# Patient Record
Sex: FEMALE | Race: BLACK OR AFRICAN AMERICAN | Employment: FULL TIME | URBAN - METROPOLITAN AREA
[De-identification: names, ages, dates, MRNs, and addresses within clinical notes are randomized per-mention and may not be internally consistent; named-entity substitution may affect disease eponyms.]

---

## 2021-08-20 ENCOUNTER — HOSPITAL ENCOUNTER (EMERGENCY)
Age: 46
Discharge: HOME | End: 2021-08-20
Attending: EMERGENCY MEDICINE
Payer: COMMERCIAL

## 2021-08-20 VITALS
BODY MASS INDEX: 38.32 KG/M2 | HEART RATE: 90 BPM | DIASTOLIC BLOOD PRESSURE: 90 MMHG | OXYGEN SATURATION: 98 % | SYSTOLIC BLOOD PRESSURE: 151 MMHG | RESPIRATION RATE: 16 BRPM | HEIGHT: 65 IN | TEMPERATURE: 98.6 F | WEIGHT: 230 LBS

## 2021-08-20 DIAGNOSIS — S39.012A STRAIN OF LUMBAR REGION, INITIAL ENCOUNTER: Primary | ICD-10-CM
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
## 2021-08-20 PROCEDURE — 99284 EMERGENCY DEPT VISIT MOD MDM: CPT

## 2021-08-20 RX ORDER — IBUPROFEN 600 MG/1
600 TABLET ORAL EVERY 8 HOURS PRN
Qty: 30 TABLET | Refills: 0 | Status: SHIPPED | OUTPATIENT
Start: 2021-08-20 | End: 2022-05-03 | Stop reason: ALTCHOICE
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
## 2022-05-03 ENCOUNTER — APPOINTMENT (OUTPATIENT)
Dept: GENERAL RADIOLOGY | Age: 47
End: 2022-05-03
Payer: COMMERCIAL

## 2022-05-03 ENCOUNTER — HOSPITAL ENCOUNTER (EMERGENCY)
Age: 47
Discharge: HOME | End: 2022-05-03
Attending: STUDENT IN AN ORGANIZED HEALTH CARE EDUCATION/TRAINING PROGRAM
Payer: COMMERCIAL

## 2022-05-03 VITALS
HEART RATE: 83 BPM | WEIGHT: 228.84 LBS | RESPIRATION RATE: 20 BRPM | HEIGHT: 66 IN | BODY MASS INDEX: 36.78 KG/M2 | SYSTOLIC BLOOD PRESSURE: 170 MMHG | DIASTOLIC BLOOD PRESSURE: 87 MMHG | OXYGEN SATURATION: 97 % | TEMPERATURE: 98.4 F

## 2022-05-03 DIAGNOSIS — M25.511 ACUTE PAIN OF RIGHT SHOULDER: Primary | ICD-10-CM

## 2022-05-03 PROCEDURE — 6370000000 HC RX 637 (ALT 250 FOR IP): Performed by: STUDENT IN AN ORGANIZED HEALTH CARE EDUCATION/TRAINING PROGRAM

## 2022-05-03 PROCEDURE — 99283 EMERGENCY DEPT VISIT LOW MDM: CPT

## 2022-05-03 PROCEDURE — 73030 X-RAY EXAM OF SHOULDER: CPT

## 2022-05-03 RX ORDER — METHOCARBAMOL 500 MG/1
500 TABLET, FILM COATED ORAL 3 TIMES DAILY PRN
Qty: 40 TABLET | Refills: 0 | Status: SHIPPED | OUTPATIENT
Start: 2022-05-03 | End: 2022-05-13

## 2022-05-03 RX ORDER — METHOCARBAMOL 750 MG/1
750 TABLET, FILM COATED ORAL ONCE
Status: DISCONTINUED | OUTPATIENT
Start: 2022-05-03 | End: 2022-05-04 | Stop reason: HOSPADM

## 2022-05-03 RX ORDER — ACETAMINOPHEN 325 MG/1
650 TABLET ORAL ONCE
Status: COMPLETED | OUTPATIENT
Start: 2022-05-03 | End: 2022-05-03

## 2022-05-03 RX ADMIN — ACETAMINOPHEN 650 MG: 325 TABLET ORAL at 22:10

## 2022-05-03 ASSESSMENT — PAIN SCALES - GENERAL: PAINLEVEL_OUTOF10: 7

## 2022-05-04 NOTE — ED NOTES
Signed into triage for pain to right shoulder, arm, and neck. Trying to finish triage questions. Finished most of medical hx. Then pt wants to wait a little bit to answer questions about chief complaint. Brought pt gown and asked her to remove street clothes and put it on.      Cristi Bo RN  05/03/22 2170

## 2022-05-04 NOTE — ED NOTES
Discharge instructions with pt. Explained rx. Encouraged to follow up with orthopedic as referred. Also encouraged follow up with her [facility] PCP.   Pain at [facility], RN  05/03/22 7959

## 2022-05-04 NOTE — ED NOTES
Pain right upper arm intermittently with movement for past 2 weeks. Pain shoots up to neck and to right upper back/  Pain at 5-6     Took tylenol at noon.        Doroteo Loera RN  05/03/22 4931

## 2022-05-04 NOTE — ED PROVIDER NOTES
Primary Care Physician: No primary care provider on file. Attending Physician: No att. providers found     History   Chief Complaint   Patient presents with    Arm Pain     Pain right upper arm intermittently with movement for past 2 weeks. Pain shoots up to neck and to right upper back/  Pain at 5-6     Took tylenol at noon.  Shoulder Pain    Neck Pain        HPI   Ignacio Feliz  is a 42 y.o. female history of diabetes, hypertension who is presenting complaining of right upper extremity pain which has been going on now for the past 2 weeks now with shooting pain to the neck as well as the upper back. Pain is 516 in intensity. She did take some Tylenol with no significant improvement. She is also complaining of some neck pain but no neck rigidity. She denies any fevers chills nausea vomiting chest pain or shortness of breath. Past Medical History:   Diagnosis Date    Diabetes mellitus ([address]     GERD (gastroesophageal reflux disease)     Hypertension         History reviewed. No pertinent surgical history.      Family History   Problem Relation Age of Onset    Diabetes Mother     Cancer Mother     Hypertension Father     Diabetes Sister     Diabetes Brother         Social History     Socioeconomic History    Marital status: Legally      Spouse name: None    Number of children: None    Years of education: None    Highest education level: None   Occupational History    None   Tobacco Use    Smoking status: Never Smoker    Smokeless tobacco: None   Substance and Sexual Activity    Alcohol use: No    Drug use: No    Sexual activity: None   Other Topics Concern    None   Social History Narrative    None     Social Determinants of Health     Financial Resource Strain:     Difficulty of Paying Living Expenses: Not on file   Food Insecurity:     Worried About Running Out of Food in the Last Year: Not on file    [place] of Food in the Last Year: Not on ROB Gonzalez Needs:     Lack of Transportation (Medical): Not on file    Lack of Transportation (Non-Medical): Not on file   Physical Activity:     Days of Exercise per Week: Not on file    Minutes of Exercise per Session: Not on file   Stress:     Feeling of Stress : Not on file   Social Connections:     Frequency of Communication with Friends and Family: Not on file    Frequency of Social Gatherings with Friends and Family: Not on file    Attends Catholic Services: Not on file    Active Member of [address] [place] [address] or Organizations: Not on file    Attends Club or Organization Meetings: Not on file    Marital Status: Not on file   Intimate Partner Violence:     Fear of Current or Ex-Partner: Not on file    Emotionally Abused: Not on file    Physically Abused: Not on file    Sexually Abused: Not on file   Housing Stability:     Unable to Pay for Housing in the Last Year: Not on file    Number of [place] in the Last Year: Not on file    Unstable Housing in the Last Year: Not on file        Review of Systems   10 total systems reviewed and found to be negative unless otherwise noted in HPI     Physical Exam   BP (!) 170/87   Pulse 83   Temp 98.4 °F (36.9 °C) (Oral)   Resp 20   Ht 5' 6\" (1.676 m)   Wt 228 lb 13.4 oz (103.8 kg)   LMP 04/12/2022   SpO2 97%   BMI 36.94 kg/m²      CONSTITUTIONAL: Well appearing, in no acute distress   HEAD: atraumatic, normocephalic   EYES: PERRL, No injection, discharge or scleral icterus. ENT: Moist mucous membranes. NECK: Normal ROM, NO LAD   CARDIOVASCULAR: Regular rate and rhythm. No murmurs or gallop. PULMONARY/CHEST: Airway patent. No retractions. Breath sounds clear with good air entry bilaterally. ABDOMEN: Soft, Non-distended and non-tender, without guarding or rebound. SKIN: Acyanotic, warm, dry   MUSCULOSKELETAL: No swelling, tenderness or deformity   NEUROLOGICAL: Awake and oriented x 3. Pulses intact. Grossly nonfocal   Nursing note and vitals reviewed.      ED Course & Medical Decision Making   Medications   acetaminophen (TYLENOL) tablet 650 mg (650 mg Oral Given 5/3/22 2210)      Labs Reviewed - No data to display   XR SHOULDER RIGHT (MIN 2 VIEWS)   Final Result   1. There is slight elevation of the distal clavicle relative to the inferior   margin of the acromion by approximately 2-3 mm.   2. The shoulder is otherwise unremarkable. PROCEDURES:   Procedures    ASSESSMENT AND PLAN:  [id number], Deborah Chaudhari is a 42 y.o. female is presenting right upper extremity pain on exam patient appears nontoxic in no acute distress tender to palpation around the bicep area. Otherwise no other abnormal findings on exam.  An x-ray was obtained with no significant findings on the x-ray. At this time the cause of her pain is unknown less likely patient needs an emergent intervention. She was discharged home with recommendation to follow-up with orthopedics for further evaluation and treatment. ClINICAL IMPRESSION:  1. Acute pain of right shoulder          PATIENT REFERRED TO:  [place] [address] [address] [address]  [phone]    Schedule an appointment as soon as possible for a visit in 2 days        DISCHARGE MEDICATIONS:  Discharge Medication List as of 5/3/2022 11:01 PM      START taking these medications    Details   methocarbamol (ROBAXIN) 500 MG tablet Take 1 tablet by mouth 3 times daily as needed (pain), Disp-40 tablet, R-0Print           DISCONTINUED MEDICATIONS:  Discharge Medication List as of 5/3/2022 11:01 PM        DISPOSITION Decision To Discharge 05/03/2022 10:49:24 PM  -We have instructed the patient, Deborah Chaudhari) to return to the ED or call her PCP if her pain/symptoms worsen. -Findings and recommendations explained to patient.  She expressed understanding and agreed with the plan.    ___________________________________________________________________________________  _________________________________________________________________________________________  This record is transcribed utilizing voice recognition technology. There are inherent limitations in this technology. In addition, there may be limitations in editing of this report. If there are any discrepancies, please contact me directly.         Maryjane Cavazos MD  05/04/22 0650

## 2022-05-04 NOTE — ED NOTES
Pt wants to drive self home. Will take robaxin at home. EMD aware. Pain at 6 right now.      Sherie Bal RN  05/03/22 3430

## 2022-05-05 ENCOUNTER — OFFICE VISIT (OUTPATIENT)
Dept: ORTHOPEDIC SURGERY | Age: 47
End: 2022-05-05
Payer: COMMERCIAL

## 2022-05-05 VITALS — BODY MASS INDEX: 36.64 KG/M2 | HEIGHT: 66 IN | WEIGHT: 228 LBS

## 2022-05-05 DIAGNOSIS — M54.12 CERVICAL RADICULITIS: ICD-10-CM

## 2022-05-05 DIAGNOSIS — M54.2 NECK PAIN: Primary | ICD-10-CM

## 2022-05-05 DIAGNOSIS — M50.30 DDD (DEGENERATIVE DISC DISEASE), CERVICAL: ICD-10-CM

## 2022-05-05 PROCEDURE — G8427 DOCREV CUR MEDS BY ELIG CLIN: HCPCS | Performed by: PHYSICIAN ASSISTANT

## 2022-05-05 PROCEDURE — 99204 OFFICE O/P NEW MOD 45 MIN: CPT | Performed by: PHYSICIAN ASSISTANT

## 2022-05-05 PROCEDURE — G8417 CALC BMI ABV UP PARAM F/U: HCPCS | Performed by: PHYSICIAN ASSISTANT

## 2022-05-05 PROCEDURE — 1036F TOBACCO NON-USER: CPT | Performed by: PHYSICIAN ASSISTANT

## 2022-05-05 RX ORDER — GABAPENTIN 100 MG/1
100 CAPSULE ORAL 3 TIMES DAILY
Qty: 90 CAPSULE | Refills: 0 | Status: SHIPPED | OUTPATIENT
Start: 2022-05-05 | End: 2022-10-03 | Stop reason: ALTCHOICE

## 2022-05-05 RX ORDER — METHYLPREDNISOLONE 4 MG/1
TABLET ORAL
Qty: 1 KIT | Refills: 0 | Status: SHIPPED | OUTPATIENT
Start: 2022-05-05 | End: 2022-05-23 | Stop reason: ALTCHOICE

## 2022-05-05 NOTE — PROGRESS NOTES
History of present illness:   Ms. Jose Alberto Mac is a pleasant 42 y.o. old female kindly referred by [facility] ER regarding Ms. Nasreen Rodrigues's neck and right arm pain. She states the pain began 3 weeks ago. Her pain has steadily worsened since then. She rates her neck and right arm pain 7/10 VAS. She describes the pain as aching, throbbing pain. The arm pain radiates to her right forearm. She reports intermittent numbness and tingling in the right upper extremity. She reports intermittent weakness of her right arm. She denies, lower extremity symptoms, gait abnormality and bowel or bladder dysfunction. The pain occasionally interferes with her sleep. She has tried medications prescribed in the ER including ibuprofen and Robaxin. Past medical history:   Her past medical history has been reviewed. Past Medical History:   Diagnosis Date    Diabetes mellitus ([address]     GERD (gastroesophageal reflux disease)     Hypertension         Her past surgical history has been reviewed. History reviewed. No pertinent surgical history. Her  medications and allergies were reviewed. Current Outpatient Medications   Medication Sig Dispense Refill    methylPREDNISolone (MEDROL, KIMBERLY,) 4 MG tablet Take by mouth. 6 po day one 5 po day 2 4 po day 3 3 po day 4 2 po day 5 1 po day 6. 1 kit 0    gabapentin (NEURONTIN) 100 MG capsule Take 1 capsule by mouth 3 times daily for 30 days. Intended supply: 30 days 90 capsule 0    methocarbamol (ROBAXIN) 500 MG tablet Take 1 tablet by mouth 3 times daily as needed (pain) 40 tablet 0     No current facility-administered medications for this visit. Allergies   Allergen Reactions    Diflucan [Fluconazole] Hives        Her social history has been reviewed. Social History     Occupational History    Not on file   Tobacco Use    Smoking status: Never Smoker    Smokeless tobacco: Never Used   Substance and Sexual Activity    Alcohol use: No    Drug use:  No  Sexual activity: Not on file         Her family history has been reviewed. Family History   Problem Relation Age of Onset    Diabetes Mother     Cancer Mother     Hypertension Father     Diabetes Sister     Diabetes Brother            Review of Systems:  I have reviewed the clinically relevant past medical history, medications, allergies, family history, social history, and 13 point Review of Systems from the patient's recent history form & documented any details relevant to today's presenting complaints in the history above. The patient's self-reported past medical history, medications, allergies, family history, social history, and Review of Systems form from today's date have been scanned into the chart under the \"Media\" tab. Physical examination:   Ms. Olena Phelps most recent vitals:  Vitals  Height: 5' 6\" (167.6 cm)  Weight: 228 lb (103.4 kg)  Body mass index is 36.8 kg/m². General Exam:  She is well-developed and well-nourished, is in obvious pain and alert and oriented to person, place, and time. She demonstrates appropriate mood and affect. She walks with a normal gait. HEENT:   Her cervical flexion, extension, and axial rotation are mildly reduced with pain. Her skin is warm and dry. She has moderate tenderness over her right lateral cervical spine and no obvious muscle spasm. The skin over her cervical spine is normal without surgical scar. Upper extremities:  She has 5/5 strength of her interosseous muscles, wrist dorsiflexors and volarflexors, biceps, triceps, deltoids, and internal and external rotators of her shoulders, bilaterally. Her biceps, triceps, bracheoradialis, quadriceps and achilles reflexes are 2+, bilaterally. Sensation is intact to light touchfrom C6 to C8. She has no clonus and negative Cornejo's bilaterally. Examination of the right shoulder: There is no deformity. There is no erythema. There is no  soft tissue swelling.    Deltoid region is not tender to palpation. Scapula/ trapezius is  tender to palpation. There is no weakness or pain with rotator cuff testing. Shoulder Active ROM -is near full without pain. Lower extremities:  Normal DTR knees, no clonus. Imaging:   X Rays were obtained in the office today:  AP and lateral cervical spine shows multilevel degenerative disc disease C4-5, C5-6 and C6-7. Diagnosis:      ICD-10-CM    1. Neck pain  M54.2 XR CERVICAL SPINE (2-3 VIEWS)   2. DDD (degenerative disc disease), cervical  M50.30 Ambulatory referral to Physical Therapy   3. Cervical radiculitis  M54.12 Ambulatory referral to Physical Therapy          Assessment:  Cervical radiculitis with degenerative disc disease cervical spine. I had an extensive discussion with Ms. Fredy Rois and/or family regarding the natural history, etiology, and long term consequences of her condition. I have presented reasonable alternatives to the patient's proposed care, treatment, and services. Risks and benefits of the treatment options also reviewed in detail. I have outlined a treatment plan with them. She has had full opportunity to ask her questions. I have answered them all to her satisfaction. I feel that Ms. Fredy Rios understands our discussion today. Plan:  Medications-  Medrol Dosepak, 4 mg tablets. Gabapentin 100 mg titrate up to 3 times daily. OTC NSAIDS discussed. 1. The most common side effects from NSAIDs are stomachaches, heartburn, and nausea. NSAIDs may irritate the stomach lining. If the medicine upsets your stomach, you can try taking it with food. But if that doesn't help, talk with your doctor to make sure it's not a more serious problem, such as a stomach ulcer or bleeding in the stomach or intestines. 2. Using NSAIDs may:  ? Lead to high blood pressure. ? Make symptoms of heart failure worse. ? Raise the risk of heart attack, stroke, kidney damage, and skin reactions.   3. Your risks are greater if you take NSAIDs at higher doses or for longer than the label says. People who are older than 72 or who have heart, stomach, or intestinal disease have a higher risk for problems. PT-PT Rx was provided today. Further Imaging-possible cervical MRI if she fails to improve with conservative treatment. Procedures-discussed possible cervical epidural steroid injections. Follow up-3 weeks. Call or return to clinic if these symptoms worsen or fail to improve as anticipated. Jm Padron PA-C   Senior Physician Assistant   [facility]                                         Disclaimer: This note was generated with use of a verbal recognition program (DRAGON) and an attempt was made to check for errors. It is possible that there are still dictated errors within this office note. If so, please bring any significant errors to my attention for an addendum. All efforts were made to ensure that this office note is accurate.

## 2022-05-23 ENCOUNTER — OFFICE VISIT (OUTPATIENT)
Dept: ORTHOPEDIC SURGERY | Age: 47
End: 2022-05-23
Payer: COMMERCIAL

## 2022-05-23 VITALS — WEIGHT: 228 LBS | BODY MASS INDEX: 36.64 KG/M2 | HEIGHT: 66 IN

## 2022-05-23 DIAGNOSIS — M54.12 CERVICAL RADICULITIS: ICD-10-CM

## 2022-05-23 DIAGNOSIS — M50.30 DDD (DEGENERATIVE DISC DISEASE), CERVICAL: Primary | ICD-10-CM

## 2022-05-23 PROCEDURE — G8417 CALC BMI ABV UP PARAM F/U: HCPCS | Performed by: PHYSICIAN ASSISTANT

## 2022-05-23 PROCEDURE — 1036F TOBACCO NON-USER: CPT | Performed by: PHYSICIAN ASSISTANT

## 2022-05-23 PROCEDURE — 99213 OFFICE O/P EST LOW 20 MIN: CPT | Performed by: PHYSICIAN ASSISTANT

## 2022-05-23 PROCEDURE — G8427 DOCREV CUR MEDS BY ELIG CLIN: HCPCS | Performed by: PHYSICIAN ASSISTANT

## 2022-05-23 RX ORDER — DEXAMETHASONE 6 MG/1
6 TABLET ORAL 2 TIMES DAILY WITH MEALS
Qty: 20 TABLET | Refills: 0 | Status: SHIPPED | OUTPATIENT
Start: 2022-05-23 | End: 2022-06-02

## 2022-05-23 NOTE — PROGRESS NOTES
Subjective:      Patient ID: Ary Grover is a 42 y.o. female who is here for follow up evaluation regarding neck and right arm pain. The symptoms began suddenly 3 to 4 weeks ago. Symptoms have continued. She did complete a Medrol Dosepak and gabapentin with minimal improvement. She attempted to get into physical therapy. She called outpatient therapy several times with no return calls. She has been doing a home exercise program on her own as previously instructed. Her pain has steadily worsened since then. She rates her neck and right arm pain 7/10 VAS. She describes the pain as aching, throbbing pain. The arm pain radiates to her right forearm. She reports intermittent numbness and tingling in the right upper extremity. She reports intermittent weakness of her right arm. She denies, lower extremity symptoms, gait abnormality and bowel or bladder dysfunction. Review Of Systems:   Negative for fever or chills. Past Medical History:   Diagnosis Date    Diabetes mellitus ([facility] [address]     GERD (gastroesophageal reflux disease)     Hypertension        Family History   Problem Relation Age of Onset    Diabetes Mother     Cancer Mother     Hypertension Father     Diabetes Sister     Diabetes Brother        History reviewed. No pertinent surgical history. Social History     Occupational History    Not on file   Tobacco Use    Smoking status: Never Smoker    Smokeless tobacco: Never Used   Substance and Sexual Activity    Alcohol use: No    Drug use: No    Sexual activity: Not on file       Current Outpatient Medications   Medication Sig Dispense Refill    dexamethasone (DECADRON) 6 MG tablet Take 1 tablet by mouth 2 times daily (with meals) for 10 days 20 tablet 0    gabapentin (NEURONTIN) 100 MG capsule Take 1 capsule by mouth 3 times daily for 30 days. Intended supply: 30 days 90 capsule 0     No current facility-administered medications for this visit.          Objective: Ht 5' 6\" (1.676 m)   Wt 228 lb (103.4 kg)   BMI 36.80 kg/m²        General Exam:  She is well-developed and well-nourished, is in obvious pain and alert and oriented to person, place, and time. She demonstrates appropriate mood and affect. She walks with a normal gait.     HEENT:   Her cervical flexion, extension, and axial rotation are mildly reduced with pain. Her skin is warm and dry. She has moderate tenderness over her right lateral cervical spine and no obvious muscle spasm. The skin over her cervical spine is normal without surgical scar.      Upper extremities:  She has 5/5 strength of her interosseous muscles, wrist dorsiflexors and volarflexors, biceps, triceps, deltoids, and internal and external rotators of her shoulders, bilaterally. Her biceps, triceps, bracheoradialis, quadriceps and achilles reflexes are 2+, bilaterally. Sensation is intact to light touchfrom C6 to C8. She has no clonus and negative Cornejo's bilaterally.         Examination of the right shoulder: There is no deformity. There is no erythema. There is no  soft tissue swelling. Deltoid region is not tender to palpation. Scapula/ trapezius is  tender to palpation. There is no weakness or pain with rotator cuff testing. Shoulder Active ROM -is near full without pain.     Lower extremities:  Normal DTR knees, no clonus. X Rays: not performed in the office today:   Previous x-rays reviewed dated 5/5/2022. AP and lateral cervical spine shows multilevel degenerative disc disease C4-5, C5-6 and C6-7. Diagnosis:       ICD-10-CM    1. DDD (degenerative disc disease), cervical  M50.30 MRI CERVICAL SPINE WO CONTRAST   2. Cervical radiculitis  M54.12 MRI CERVICAL SPINE WO CONTRAST        Assessment and Plan:       Assessment:  Cervical radiculitis with degenerative disc disease of the cervical spine. Minimal improvement with recent Medrol Dosepak and gabapentin.   She has been doing at home therapy stretching program without improvement. I had an extensive discussion with Ms. Shanon Gore regarding the natural history, etiology, and long term consequences of her condition. I have presented reasonable alternatives to the patient's proposed care, treatment, and services. Risks and benefits of the treatment options also reviewed in detail. I have outlined a treatment plan with them. She has had full opportunity to ask her questions. I have answered them all to her satisfaction. I feel that Ms. Shanon Gore understands our discussion today. Plan:  Medications-   Decadron 6 mg tablets twice daily. PT-other options for therapy discussed. Further Imaging-MRI cervical spine due to failure to improve with conservative treatment including Medrol Dosepak, gabapentin and previously instructed home therapy. Procedures-she may do well with epidural steroid injections. Follow up-after cervical spine MRI to review test results. Call or return to clinic if these symptoms worsen or fail to improve as anticipated. Pelon Ayala PA-C   Senior Physician Assistant   [facility]                                         Disclaimer: This note was generated with use of a verbal recognition program (DRAGON) and an attempt was made to check for errors. It is possible that there are still dictated errors within this office note. If so, please bring any significant errors to my attention for an addendum. All efforts were made to ensure that this office note is accurate.

## 2022-05-31 ENCOUNTER — TELEPHONE (OUTPATIENT)
Dept: ORTHOPEDIC SURGERY | Age: 47
End: 2022-05-31

## 2022-06-06 NOTE — TELEPHONE ENCOUNTER
Patient called her insurance. They stated that we need to resubmit what she has been doing for her neck. I informed her I will speak with Sarah Gorman about this on Wednesday, when he returns. Therapy is also not returning her calls.

## 2022-06-06 NOTE — TELEPHONE ENCOUNTER
General Question     Subject: MRI DENIAL  Patient and /or Facility Request: Kaycee Johnson  Contact Number: [phone]    PATIENT CALLING REGARDING HER MRI WAS DENIED. PATIENT HAS TRIED TO CALL [facility] AND IT KEEPS GOING TO . I GAVE PATIENT THE PT FOR [facility] [phone]. PATIENT STATED THAT SHE HAS DONE 2 ROUNDS OF STEROID INJECTIONS, SHE'S WORKING OUT, AND GETTING MASSAGES, AND SWIMMING. NOTHING IS HELPING. PATIENT WILL CALL THE PT NUMBER TO GET PT SCHEDULED. PLEASE CALL BACK AT THE ABOVE NUMBER.

## 2022-06-07 ENCOUNTER — HOSPITAL ENCOUNTER (OUTPATIENT)
Dept: PHYSICAL THERAPY | Age: 47
Setting detail: THERAPIES SERIES
Discharge: HOME | End: 2022-06-07
Payer: COMMERCIAL

## 2022-06-07 PROCEDURE — 97110 THERAPEUTIC EXERCISES: CPT

## 2022-06-07 PROCEDURE — 97530 THERAPEUTIC ACTIVITIES: CPT

## 2022-06-07 PROCEDURE — 97161 PT EVAL LOW COMPLEX 20 MIN: CPT

## 2022-06-07 NOTE — FLOWSHEET NOTE
[facility]  [address] [place], [facility]  Phone: [phone]   Fax:     [phone]    Physical Therapy Treatment Note/ Progress Report:     Date:  2022    Patient Name:  Ke Evans    :  1975  MRN: [id number]    Pertinent Medical History: Additional Pertinent Hx: DM, HTN    Medical/Treatment Diagnosis Information:  · Diagnosis: M50.30 DDD cervical; M54.12 cervical radiculitis  · Treatment Diagnosis: pain in neck and R UE; weakness R UE limiting ADLs    Insurance/Certification information:  PT Insurance Information: caresource  Physician Information:  Referring Provider (secondary): Rick TRENT  Plan of care signed (Y/N): sent to inbox    Date of Patient follow up with Physician:      Progress Report: []  Yes  [x]  No     Date Range for reporting period:  Beginnin2022  Ending:     Progress report due (10 Rx/or 30 days whichever is less): 38     Recertification due (POC duration/ or 90 days whichever is less):      Visit # Insurance/POC Allowable Auth Needed    [employer] auth [x]Yes    []No     Functional Outcomes Measure:   Date Assessed: at eval  Test: FOTO  Score: 33    Pain level:  5-6/10     History of Injury:Patient stated complaint: Pt states woke up several weeks ago with R UE pain. Pain has worsened since then in neck and radiating to R UE with pain and numbness/tingling constantly. Pt rating pain at 5-6/10. Xray showing multilevel DDD C4-7.   MD ordered MRI and is suggesting possible NATHAN, but pt needs to complete PT first for ins approval.     SUBJECTIVE:  See eval    OBJECTIVE:    Observation:    Test measurements:      RESTRICTIONS/PRECAUTIONS:     Exercises/Interventions:   Therapeutic Ex (36925)  Min: Resistance/Repetitions Notes   R UT str add                        Therapeutic Activity (20023) Min: 8 See below                         NMR re-education (90417) Min: 8     Seated cervical retraction X 5     Scapular retraction  Shoulder rolls X 5  X 5    Supine chin nod add         Manual Intervention (44141)  Min:2     c-tx Manual x 10   Manual with L SB x 10  Relieved N/T - proceed with [facility] c-tx              Modalities  Min:      % 1.5 w/cm to neck/R UT add    [facility] c-tx with [facility]  add Start at 10-15# SIS          Other Therapeutic Activities:  Pt was educated on PT POC, Diagnosis, Prognosis, pathomechanics as well as frequency and duration of scheduling future physical therapy appointments. Time was also taken on this day to answer all patient questions and participation in PT. Reviewed appointment policy in detail with patient and patient verbalized understanding. X 8 min     Home Exercise Program:Patient was instructed in the following for HEP:     . Patient verbalized/demonstrated understanding and was issued written handout. Therapeutic Exercise and NMR EXR  [x] (15424) Provided verbal/tactile cueing for activities related to strengthening, flexibility, endurance, ROM  for improvements in cervical, postural, scapular, scapulothoracic and UE control with self care, reaching, carrying, lifting, house/yardwork, driving/computer work.    [] (64450) Provided verbal/tactile cueing for activities related to improving balance, coordination, kinesthetic sense, posture, motor skill, proprioception  to assist with cervical, scapular, scapulothoracic and UE control with self care, reaching, carrying, lifting, house/yardwork, driving/computer work. Therapeutic Activities:    [x] (89530 or 56934) Provided verbal/tactile cueing for activities related to improving balance, coordination, kinesthetic sense, posture, motor skill, proprioception and motor activation to allow for proper function of cervical, scapular, scapulothoracic and UE control with self care, carrying, lifting, driving/computer work.      Home Exercise Program:    [x] (68413) Reviewed/Progressed HEP activities related to strengthening, flexibility, endurance, ROM of cervical, scapular, scapulothoracic and UE control with self care, reaching, carrying, lifting, house/yardwork, driving/computer work  [] (73494) Reviewed/Progressed HEP activities related to improving balance, coordination, kinesthetic sense, posture, motor skill, proprioception of cervical, scapular, scapulothoracic and UE control with self care, reaching, carrying, lifting, house/yardwork, driving/computer work      Manual Treatments:  PROM / STM / Oscillations-Mobs:  G-I, II, III, IV (PA's, Inf., Post.)  [x] (45361) Provided manual therapy to mobilize soft tissue/joints of cervical/CT, scapular GHJ and UE for the purpose of decreasing headache, modulating pain, promoting relaxation,  increasing ROM, reducing/eliminating soft tissue swelling/inflammation/restriction, improving soft tissue extensibility and allowing for proper ROM for normal function with self care, reaching, carrying, lifting, house/yardwork, driving/computer work        Approval Dates:  CPT Code Units Approved Units Used  Date Updated:                     Charges:  Timed Code Treatment Minutes: 18   Total Treatment Minutes: 38     [x] EVAL (LOW) 53851 (typically 20 minutes face-to-face)  [] EVAL (MOD) 69004 (typically 30 minutes face-to-face)  [] EVAL (HIGH) 66876 (typically 45 minutes face-to-face)  [] RE-EVAL     [x] SF(07835) x     [] Dry needle 1 or 2 Muscles (47849)  [] NMR (38147) x     [] Dry needle 3+ Muscles (64540)  [] Manual (15339) x     [] Ultrasound (01316) x  [x] TA (37984) x     [] Mech Traction (69722)  [] ES(attended) (31843)     [] ES (un) (93545):   [] Vasopump (32435) [] Ionto (93412)   [] Other:    GOALS:  Patient stated goal:\"relieve pain\"  [x]? Progressing: []? Met: []? Not Met: []? Adjusted     Therapist goals for Patient:   Short Term Goals: To be achieved in: 2 -4 weeks  1.  Independent in HEP and progression per patient tolerance, in order to prevent re-injury. [x]? Progressing: []? Met: []? Not Met: []? Adjusted  2. Patient will have a decrease in pain by 30-40% to facilitate improvement in movement, function, and ADLs as indicated by Functional Deficits. [x]? Progressing: []? Met: []? Not Met: []? Adjusted     Long Term Goals: To be achieved in: at d/c  1. Increase FOTO - neck functional outcome score from 33 to 56 to assist with reaching prior level of function. [x]? Progressing: []? Met: []? Not Met: []? Adjusted  2. Patient will have a decrease in pain by 60-70% to facilitate improvement in movement, function, and ADLs as indicated by Functional Deficits. [x]? Progressing: []? Met: []? Not Met: []? Adjusted  3. Patient will demonstrate increased AROM to [facility] of cervical/thoracic spine to allow for proper joint functioning as indicated by patients Functional Deficits. [x]? Progressing: []? Met: []? Not Met: []? Adjusted  4. Patient will demonstrate an increase in postural awareness and control and activation of  Deep cervical stabilizers to allow for proper functional mobility as indicated by patients Functional Deficits. [x]? Progressing: []? Met: []? Not Met: []? Adjusted  5. Patient will return to sleep and self care without increased symptoms or restriction. [x]? Progressing: []? Met: []? Not Met: []? Adjusted  6. \"work and normal activities without pain\"  [x]? Progressing: []? Met: []? Not Met: []? Adjusted         ASSESSMENT:  See eval    Treatment/Activity Tolerance:  [x] Patient tolerated treatment well [] Patient limited by fatique  [] Patient limited by pain  [] Patient limited by other medical complications  [] Other:     Overall Progression Towards Functional goals/ Treatment Progress Update:  [] Patient is progressing as expected towards functional goals listed. [] Progression is slowed due to complexities/Impairments listed. [] Progression has been slowed due to co-morbidities.   [x] Plan just implemented, too soon to assess goals progression <30days   [] Goals require adjustment due to lack of progress  [] Patient is not progressing as expected and requires additional follow up with physician  [] Other    Prognosis for POC: [x] Good [] Fair  [] Poor    Patient requires continued skilled intervention: [x] Yes  [] No        PLAN: See eval; [facility] c-tx  [] Continue per plan of care [] Alter current plan (see comments)  [x] Plan of care initiated [] Hold pending MD visit [] Discharge    Electronically signed by: Chap[place]ito Meredith, PTMPT [id number]    Note: If patient does not return for scheduled/recommended follow up visits, this note will serve as a discharge from care along with the most recent update on progress.

## 2022-06-07 NOTE — PLAN OF CARE
[facility] - Outpatient Rehabilitation and Therapy,  [facility]  [address] [facility], [facility]  Phone: [phone]   Fax:     [phone]          Physical Therapy Certification    Dear Referring Provider (secondary): Alexandrea TRENT,    We had the pleasure of evaluating the following patient for physical therapy services at [facility] and [facility]. A summary of our findings can be found in the initial assessment below. This includes our plan of care. If you have any questions or concerns regarding these findings, please do not hesitate to contact me at the office phone number checked above. Thank you for the referral.       Physician Signature:_______________________________Date:__________________  By signing above (or electronic signature), therapists plan is approved by physician            Patient: Arlene Corrales   : 1975   MRN: [id number]  Referring Physician: Referring Provider (secondary): Alexandrea TRENT      Evaluation Date: 2022      Medical Diagnosis Information:  Diagnosis: M50.30 DDD cervical; M54.12 cervical radiculitis   Treatment Diagnosis: pain in neck and R UE; weakness R UE limiting ADLs                                         Insurance information: PT Insurance Information: caresource    Precautions/ Contra-indications:   Latex Allergy:  [x]NO      []YES  Preferred Language for Healthcare:   [x]English       []other:    C-SSRS Triggered by Intake questionnaire (Past 2 wk assessment ):   [x] No, Questionnaire did not trigger screening.   [] Yes, Patient intake triggered C-SSRS Screening      [] C-SSRS Screening completed  [] PCP notified via Epic     SUBJECTIVE: Patient stated complaint: Pt states woke up several weeks ago with R UE pain. Pain has worsened since then in neck and radiating to R UE with pain and numbness/tingling constantly. Pt rating pain at 5-6/10.   Xray showing multilevel DDD C4-7. MD ordered MRI and is suggesting possible NATHAN, but pt needs to complete PT first for ins approval.     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      Living Status/Prior Level of Function: Independent with ADLs and IADLs,     OBJECTIVE:   Palpation: very mild TTP R scap/mid back, R shoulder, UT     Functional Mobility/Transfers: independent     Posture: WFL    Bandages/Dressings/Incisions: NA    Gait: (include devices/WB status):  WNL    CERV ROM     Cervical Flexion 30 neck pain     Cervical Extension 40 neck pain      Left Right   Cervical SB 35 30   Cervical rotation 51 slight tingle in arm w/ audible pop in neck 66   Quadrant     Dorsal Glide      UE ROM Left Right   Shoulder Flex  WNL   Shoulder Abd  WNL   Shoulder ER  WNL   Shoulder IR  WNL   Elbow flex/ext     Wrist flex/ext/pro/sup     Finger flex/ext/opposition     Shoulder AROM WNL w OP     UE Strength  Left Right   Shoulder Flex  5-   Shoulder Scap     Shoulder ABd (C5 Axillary)  5-   Shoulder ER   5-   Shoulder IR  5-   Elbow Flex (C5 Musc)  5   Elbow Ext (C7 Radial)  5-   Wrist Flex (C6 Radial)  5-   Wrist Ext (C7 Radial)  5-   Finger flex (C8 median)   dec slightly    Finger ext (C7 Radial-PIN)     APB (T1 Median)     Finger Abd (T1 Ulnar)     UE myotomes WNL        Reflexes: NT -  WNL per MD notes  Normal Abnormal Comments               S1-2 Seated achilles [] []    S1-2 Prone knee bend [] []    L3-4 Patellar tendon [] []    C5-6 Biceps [] []    C6 Brachioradialis [] []    C7-8 Triceps [] []        Joint mobility: []Normal    [x]Hypo   []Hyper      Orthopedic Special Tests:     Cluster Testing  Normal Abnormal N/A Comments   Babinski Test [] [] []    Cornejo Test [] [] []    Inverted Sup Sign [] [] []    Alar Ligament Test [] [] []    Transverse Ligament Test [] [] []    Sharp-Kayla Test [] [] []    Hautards Test [] [] []    Vertebral Artery Test [] [] []             Neural dynamic/ Tension testing Normal Abnormal N/A Comments   Spurling Maneuver:  [] [] []    Distraction testing: [] [] []    ULNT [] [] []    Shoulder Abd testing  [] [] []    Cerv Rot/Lat Flex- 1st Rib [] [] []    Deep Neck Flex/endurance testing [] [] []    Craniocerv Flex testing [place] [] [] []    End Range Tolerance testing. [] [] []    Man c-tx [] [] [] Relief of tingling in R hand                          [x] Patient history, allergies, meds reviewed. Medical chart reviewed. See intake form. Review Of Systems (ROS):  [x]Performed Review of systems (Integumentary, CardioPulmonary, Neurological) by intake and observation. Intake form has been scanned into medical record. Patient has been instructed to contact their primary care physician regarding ROS issues if not already being addressed at this time.       Co-morbidities/Complexities (which will affect course of rehabilitation):   []None        []Hx of COVID   Arthritic conditions   []Rheumatoid arthritis (M05.9)  []Osteoarthritis (M19.91)  []Gout   Cardiovascular conditions   [x]Hypertension (I10)  []Hyperlipidemia (E78.5)  []Angina pectoris (I20)  []Atherosclerosis (I70)  []Pacemaker  []Hx of CABG/stent/  cardiac surgeries   Musculoskeletal conditions   []Disc pathology   []Congenital spine pathologies   []Osteoporosis (M81.8)  []Osteopenia (M85.8)  []Scoliosis       Endocrine conditions   []Hypothyroid (E03.9)  []Hyperthyroid Gastrointestinal conditions   []Constipation (G19.18)   Metabolic conditions   []Morbid obesity (E66.01)  [x]Diabetes type 1(E10.65) or 2 (E11.65)   []Neuropathy (G60.9) Cardio/Pulmonary conditions   []Asthma (J45)  []Coughing   []COPD (J44.9)  []CHF  []A-fib   Psychological Disorders  []Anxiety (F41.9)  []Depression (F32.9)   []Other:   Developmental Disorders  []Autism (F84.0)  []CP (G80)  []Down Syndrome (Q90.9)  []Developmental delay     Neurological conditions  []Prior Stroke (I69.30)  []Parkinson's (G20)  []Encephalopathy (G93.40)  []MS (G35)  []Post-polio (G14)  []SCI  []TBI  []ALS Other conditions  []Fibromyalgia (M79.7)  []Vertigo  []Syncope  []Kidney Failure  []Cancer      []currently undergoing                treatment  []Pregnancy  []Incontinence   Prior surgeries  []involved limb  []previous spinal surgery  [] section birth  []hysterectomy  []bowel / bladder surgery  []other relevant surgeries   []Other:              Barriers to/and or personal factors that will affect rehab potential:              []Age  []Sex   []Smoker              []Motivation/Lack of Motivation                        []Co-Morbidities              []Cognitive Function, education/learning barriers              []Environmental, home barriers              []profession/work barriers  []past PT/medical experience  []other:  Justification:     Falls Risk Assessment (30 days):   [x] Falls Risk assessed and no intervention required.   [] Falls Risk assessed and Patient requires intervention due to being higher risk   TUG score (>12s at risk):     [] Falls education provided, including     ASSESSMENT:    Functional Impairments:     [x]Noted cervical/thoracic/GHJ joint hypomobility   []Noted cervical/thoracic/GHJ joint hypermobility   [x]Decreased cervical/UE functional ROM   []Noted Headache pain aggravated by neck movements with/without dizziness   []Abnormal reflexes/sensation/myotomal/dermatomal deficits   []Decreased DCF control or ability to hold head up   []Decreased RC/scapular/core strength and neuromuscular control    [x]Decreased UE functional strength   []other:      Functional Activity Limitations (from functional questionnaire and intake)   [x]Reduced ability to tolerate prolonged functional positions   [x]Reduced ability or difficulty with changes of positions or transfers between positions   [x]Reduced ability to maintain good posture and demonstrate good body mechanics with sitting, bending, and lifting   [x] Reduced ability or tolerance with driving and/or computer work   [x]Reduced ability to perform lifting, reaching, carrying tasks   []Reduced ability to concentrate   [x]Reduced ability to sleep    [x]Reduced ability to tolerate any impact through UE or spine   []Reduced ability to ambulate prolonged functional periods/distances   []other:    Participation Restrictions   [x]Reduced participation in self care activities   [x]Reduced participation in home management activities   [x]Reduced participation in work activities   [x]Reduced participation in social activities. []Reduced participation in sport/recreational activities. Classification/Subgrouping:   []signs/symptoms consistent with neck pain with mobility deficits     []signs/symptoms consistent with neck pain with movement coordinated impairments    [x]signs/symptoms consistent with neck pain with radiating pain    []signs/symptoms consistent with neck pain with headaches (cervicogenic)    []Signs/symptoms consistent with nerve root involvement including myotome & dermatome dysfunction   []sign/symptoms consistent with facet dysfunction of cervical and thoracic spine    []signs/symptoms consistent suggesting central cord compression/UMN syndromes   []signs/symptoms consistent with discogenic cervical pain   []signs/symptoms consistent with rib dysfunction   []signs/symptoms consistent with postural dysfunction   []signs/symptoms consistent with shoulder pathology    []signs/symptoms consistent with post-surgical status including decreased ROM, strength and function.    []signs/symptoms consistent with pathology which may benefit from Dry Needling   []signs/symptoms which may limit the use of advanced manual therapy techniques: (Elevated CV risk profile, recent trauma, intolerance to end range positions, prior TIA, visual issues, UE neurological compromise )     Prognosis/Rehab Potential:      []Excellent   [x]Good    []Fair   []Poor    Tolerance of evaluation/treatment:    []Excellent   [x]Good    []Fair   []Poor    Physical Therapy Evaluation Complexity Justification  [x] A history of present problem with:  [x] no personal factors and/or comorbidities that impact the plan of care;  []1-2 personal factors and/or comorbidities that impact the plan of care  []3 personal factors and/or comorbidities that impact the plan of care  [x] An examination of body systems using standardized tests and measures addressing any of the following: body structures and functions (impairments), activity limitations, and/or participation restrictions;:  [x] a total of 1-2 or more elements   [x] a total of 3 or more elements   [] a total of 4 or more elements   [x] A clinical presentation with:  [x] stable and/or uncomplicated characteristics   [] evolving clinical presentation with changing characteristics  [] unstable and unpredictable characteristics;   [x] Clinical decision making of [x] low, [] moderate, [] high complexity using standardized patient assessment instrument and/or measurable assessment of functional outcome. [x] EVAL (LOW) 67406 (typically 20 minutes face-to-face)  [] EVAL (MOD) 29422 (typically 30 minutes face-to-face)  [] EVAL (HIGH) 96087 (typically 45 minutes face-to-face)  [] RE-EVAL     PLAN:   Frequency/Duration:  2-3 days per week for 4-8 Weeks:  Interventions:  [x]  Therapeutic exercise including: strength training, ROM, for cervical spine,scapula, core and Upper extremity, including postural re-education.    [x]  NMR activation and proprioception for Deep cervical flexors, periscapular and RC muscles and Core, including postural [] Adjusted  5. Patient will return to sleep and self care without increased symptoms or restriction. [x] Progressing: [] Met: [] Not Met: [] Adjusted  6. \"work and normal activities without pain\"  [x] Progressing: [] Met: [] Not Met: [] Adjusted         Electronically signed by:  Gina Wong, PTMPT [id number]      Note: If patient does not return for scheduled/recommended follow up visits, this note will serve as a discharge from care along with the most recent update on progress.

## 2022-06-14 ENCOUNTER — HOSPITAL ENCOUNTER (OUTPATIENT)
Dept: PHYSICAL THERAPY | Age: 47
Setting detail: THERAPIES SERIES
Discharge: HOME | End: 2022-06-14
Payer: COMMERCIAL

## 2022-06-14 PROCEDURE — 97530 THERAPEUTIC ACTIVITIES: CPT

## 2022-06-14 PROCEDURE — 97112 NEUROMUSCULAR REEDUCATION: CPT

## 2022-06-14 PROCEDURE — 97140 MANUAL THERAPY 1/> REGIONS: CPT

## 2022-06-14 PROCEDURE — 97012 MECHANICAL TRACTION THERAPY: CPT

## 2022-06-14 NOTE — FLOWSHEET NOTE
[facility], [facility]  [address] [facility] [place], [facility]  Phone: [phone]   Fax:     [phone]    Physical Therapy Treatment Note/ Progress Report:     Date:  2022    Patient Name:  Kevin Lopez    :  1975  MRN: [id number]    Pertinent Medical History: Additional Pertinent Hx: DM, HTN    Medical/Treatment Diagnosis Information:  · Diagnosis: M50.30 DDD cervical; M54.12 cervical radiculitis  · Treatment Diagnosis: pain in neck and R UE; weakness R UE limiting ADLs    Insurance/Certification information:  PT Insurance Information: caresource  Physician Information:  Referring Provider (secondary): Kike TRENT  Plan of care signed (Y/N): sent to inbox    Date of Patient follow up with Physician:      Progress Report: []  Yes  [x]  No     Date Range for reporting period:  Beginnin2022  Ending:     Progress report due (10 Rx/or 30 days whichever is less): 8/3/50     Recertification due (POC duration/ or 90 days whichever is less):      Visit # Insurance/POC Allowable Auth Needed    Caresource auth [x]Yes    []No     Functional Outcomes Measure:   Date Assessed: at eval  Test: FOTO  Score: 33    Pain level:  5-6/10     History of Injury:Patient stated complaint: Pt states woke up several weeks ago with R UE pain. Pain has worsened since then in neck and radiating to R UE with pain and numbness/tingling constantly. Pt rating pain at 5-6/10. Xray showing multilevel DDD C4-7. MD ordered MRI and is suggesting possible NATHAN, but pt needs to complete PT first for ins approval.     SUBJECTIVE:  See eval  : Felt a little bit of the stress taken off of her after the eval, but by night it had returned.  Not getting any sleep    OBJECTIVE:    Observation:    Test measurements:      RESTRICTIONS/PRECAUTIONS:     Exercises/Interventions:   Therapeutic Ex (74442)  Min: Resistance/Repetitions Notes   R UT str [id number]                        Therapeutic Activity (08294) Min:  See below      Discussed/educated in depth on the importance of good posture and increased postural awareness. Patient's significant other also present is order to assist patient with exercises and postural awareness at home                    NMR re-education (08195) Min:      Seated cervical retraction X 5     Scapular retraction  Shoulder rolls X 5  X 5    Supine chin nod X 5         Manual Intervention (78770)  Min:      Man c-tx  L SB stretch Manual x 3   X 3 Relieved N/T - proceed with [facility] c-tx              Modalities  Min:      % 1.5 w/cm to neck/R UT add As needed   [facility] c-tx with [facility]HP  SIS 10#17#  x12 min  (started at 15#, but pt could not feel a stretch) After c-tx:  NT/pain moved from hand to shoulder blade. Mild NT still present in fingertips          Other Therapeutic Activities:  Pt was educated on PT POC, Diagnosis, Prognosis, pathomechanics as well as frequency and duration of scheduling future physical therapy appointments. Time was also taken on this day to answer all patient questions and participation in PT. Reviewed appointment policy in detail with patient and patient verbalized understanding. X 8 min     Home Exercise Program:Patient was instructed in the following for HEP:     . Patient verbalized/demonstrated understanding and was issued written handout.     Therapeutic Exercise and NMR EXR  [x] (97460) Provided verbal/tactile cueing for activities related to strengthening, flexibility, endurance, ROM  for improvements in cervical, postural, scapular, scapulothoracic and UE control with self care, reaching, carrying, lifting, house/yardwork, driving/computer work.    [] (58552) Provided verbal/tactile cueing for activities related to improving balance, coordination, kinesthetic sense, posture, motor skill, proprioception  to assist with cervical, scapular, scapulothoracic and UE control with self care, reaching, carrying, lifting, house/yardwork, driving/computer work. Therapeutic Activities:    [x] (74084 or 86116) Provided verbal/tactile cueing for activities related to improving balance, coordination, kinesthetic sense, posture, motor skill, proprioception and motor activation to allow for proper function of cervical, scapular, scapulothoracic and UE control with self care, carrying, lifting, driving/computer work.      Home Exercise Program:    [x] (52201) Reviewed/Progressed HEP activities related to strengthening, flexibility, endurance, ROM of cervical, scapular, scapulothoracic and UE control with self care, reaching, carrying, lifting, house/yardwork, driving/computer work  [] (83594) Reviewed/Progressed HEP activities related to improving balance, coordination, kinesthetic sense, posture, motor skill, proprioception of cervical, scapular, scapulothoracic and UE control with self care, reaching, carrying, lifting, house/yardwork, driving/computer work      Manual Treatments:  PROM / STM / Oscillations-Mobs:  G-I, II, III, IV (PA's, Inf., Post.)  [x] (18956) Provided manual therapy to mobilize soft tissue/joints of cervical/CT, scapular GHJ and UE for the purpose of decreasing headache, modulating pain, promoting relaxation,  increasing ROM, reducing/eliminating soft tissue swelling/inflammation/restriction, improving soft tissue extensibility and allowing for proper ROM for normal function with self care, reaching, carrying, lifting, house/yardwork, driving/computer work        Approval Dates:  CPT Code Units Approved Units Used  Date Updated:                     Charges:  Timed Code Treatment Minutes: 40   Total Treatment Minutes: 55     [] EVAL (LOW) 24125 (typically 20 minutes face-to-face)  [] EVAL (MOD) 93733 (typically 30 minutes face-to-face)  [] EVAL (HIGH) 02026 (typically 45 minutes face-to-face)  [] RE-EVAL     [x] DI(78147) x     [] Dry needle 1 or 2 Muscles (08093)  [x] NMR (97254) x     [] Dry needle 3+ Muscles (37913)  [x] Manual (28841) x     [] Ultrasound (06673) x  [] TA (40896) x     [x] Mech Traction (40215)  [] ES(attended) (52029)     [] ES (un) (10507):   [] Vasopump (65844) [] Ionto (54023)   [] Other:    GOALS:  Patient stated goal:\"relieve pain\"  [x]? Progressing: []? Met: []? Not Met: []? Adjusted     Therapist goals for Patient:   Short Term Goals: To be achieved in: 2 -4 weeks  1. Independent in HEP and progression per patient tolerance, in order to prevent re-injury. [x]? Progressing: []? Met: []? Not Met: []? Adjusted  2. Patient will have a decrease in pain by 30-40% to facilitate improvement in movement, function, and ADLs as indicated by Functional Deficits. [x]? Progressing: []? Met: []? Not Met: []? Adjusted     Long Term Goals: To be achieved in: at d/c  1. Increase FOTO - neck functional outcome score from 33 to 56 to assist with reaching prior level of function. [x]? Progressing: []? Met: []? Not Met: []? Adjusted  2. Patient will have a decrease in pain by 60-70% to facilitate improvement in movement, function, and ADLs as indicated by Functional Deficits. [x]? Progressing: []? Met: []? Not Met: []? Adjusted  3. Patient will demonstrate increased AROM to [facility] of cervical/thoracic spine to allow for proper joint functioning as indicated by patients Functional Deficits. [x]? Progressing: []? Met: []? Not Met: []? Adjusted  4. Patient will demonstrate an increase in postural awareness and control and activation of  Deep cervical stabilizers to allow for proper functional mobility as indicated by patients Functional Deficits. [x]? Progressing: []? Met: []? Not Met: []? Adjusted  5. Patient will return to sleep and self care without increased symptoms or restriction. [x]? Progressing: []? Met: []? Not Met: []? Adjusted  6. \"work and normal activities without pain\"  [x]? Progressing: []? Met: []? Not Met: []?  Adjusted         ASSESSMENT: Reviewed exercises. Demonstration and cues for proper technique. Treatment/Activity Tolerance:  [x] Patient tolerated treatment well [] Patient limited by fatique  [] Patient limited by pain  [] Patient limited by other medical complications  [] Other:     Overall Progression Towards Functional goals/ Treatment Progress Update:  [] Patient is progressing as expected towards functional goals listed. [] Progression is slowed due to complexities/Impairments listed. [] Progression has been slowed due to co-morbidities. [x] Plan just implemented, too soon to assess goals progression <30days   [] Goals require adjustment due to lack of progress  [] Patient is not progressing as expected and requires additional follow up with physician  [] Other    Prognosis for POC: [x] Good [] Fair  [] Poor    Patient requires continued skilled intervention: [x] Yes  [] No        PLAN: See eval; Assess [facility] c-tx  [] Continue per plan of care [] Alter current plan (see comments)  [x] Plan of care initiated [] Hold pending MD visit [] Discharge    Electronically signed by: Santiago Philippe, PTA  [id number]    Note: If patient does not return for scheduled/recommended follow up visits, this note will serve as a discharge from care along with the most recent update on progress.

## 2022-06-16 ENCOUNTER — HOSPITAL ENCOUNTER (OUTPATIENT)
Dept: PHYSICAL THERAPY | Age: 47
Setting detail: THERAPIES SERIES
Discharge: HOME | End: 2022-06-16
Payer: COMMERCIAL

## 2022-06-16 PROCEDURE — 97112 NEUROMUSCULAR REEDUCATION: CPT

## 2022-06-16 PROCEDURE — 97012 MECHANICAL TRACTION THERAPY: CPT

## 2022-06-16 PROCEDURE — 97035 APP MDLTY 1+ULTRASOUND EA 15: CPT

## 2022-06-16 NOTE — FLOWSHEET NOTE
[facility], [facility]  [address] [facility] [place], [facility]  Phone: [phone]   Fax:     [phone]    Physical Therapy Treatment Note/ Progress Report:     Date:  2022    Patient Name:  Kevin Lopez    :  1975  MRN: [id number]    Pertinent Medical History: Additional Pertinent Hx: DM, HTN    Medical/Treatment Diagnosis Information:  · Diagnosis: M50.30 DDD cervical; M54.12 cervical radiculitis  · Treatment Diagnosis: pain in neck and R UE; weakness R UE limiting ADLs    Insurance/Certification information:  PT Insurance Information: caresource  Physician Information:  Referring Provider (secondary): Kike TRENT  Plan of care signed (Y/N): Yes    Date of Patient follow up with Physician:      Progress Report: []  Yes  [x]  No     Date Range for reporting period:  Beginnin2022  Ending:     Progress report due (10 Rx/or 30 days whichever is less):      Recertification due (POC duration/ or 90 days whichever is less):      Visit # Insurance/POC Allowable Auth Needed   3/8 [employer] auth  128 units thru  [x]Yes    []No     Functional Outcomes Measure:   Date Assessed: at eval  Test: FOTO  Score: 33    Pain level:  6-7/10     History of Injury:Patient stated complaint: Pt states woke up several weeks ago with R UE pain. Pain has worsened since then in neck and radiating to R UE with pain and numbness/tingling constantly. Pt rating pain at 5-6/10. Xray showing multilevel DDD C4-7. MD ordered MRI and is suggesting possible NATHAN, but pt needs to complete PT first for ins approval.     SUBJECTIVE:  See eval  : Felt a little bit of the stress taken off of her after the eval, but by night it had returned.  Not getting any sleep  : relief somewhat after last session, left with only tingling in fingertips and pain in shoulder blade and that lasted til evening but then pain and N/T returned by evening     OBJECTIVE:    Observation:    Test measurements:      RESTRICTIONS/PRECAUTIONS:     Exercises/Interventions:   Therapeutic Ex (23107)  Min: 5 Resistance/Repetitions Notes   R UT str  Stopped after 2, tingling increased                        Therapeutic Activity (47730) Min:                          NMR re-education (70561) Min: 10     Seated cervical retraction X 10     Scapular retraction  Shoulder rolls  Hep - does a lot b/c helps somewhat    Supine chin nod X 10         Manual Intervention (42320)  Min: 5     Man c-tx  L SB stretch 3 x 10 sec   3 x 10 sec  Relieved hand and forearm pain, tingling still in fingertips - proceed with [facility] c-tx              Modalities  Min: 25     % 1.5 w/cm to R neck/R UT/ R upper scap mm X 8 min  Dec R UE s/s, cont N/T in fingertips     [facility] c-tx with MHP  SIS 20# max/ 15# min   60 sec on/20 sec off  x12 min    (PT error, forgot MHP resume next time)      After c-tx:  NT only in fingertips but less intense, no other c/o R UE and no pain shoulder blade     Inc c-tx wt seemed to help dec s/s, plan to try inc wt vs static hold or inc time          Other Therapeutic Activities:  Pt was educated on PT POC, Diagnosis, Prognosis, pathomechanics as well as frequency and duration of scheduling future physical therapy appointments. Time was also taken on this day to answer all patient questions and participation in PT. Reviewed appointment policy in detail with patient and patient verbalized understanding. X 8 min     Home Exercise Program:Patient was instructed in the following for HEP:     . Patient verbalized/demonstrated understanding and was issued written handout.     Therapeutic Exercise and NMR EXR  [x] (79212) Provided verbal/tactile cueing for activities related to strengthening, flexibility, endurance, ROM  for improvements in cervical, postural, scapular, scapulothoracic and UE control with self care, reaching, carrying, lifting, house/yardwork, driving/computer work.    [] (58761) Provided verbal/tactile cueing for activities related to improving balance, coordination, kinesthetic sense, posture, motor skill, proprioception  to assist with cervical, scapular, scapulothoracic and UE control with self care, reaching, carrying, lifting, house/yardwork, driving/computer work. Therapeutic Activities:    [x] (12681 or 77159) Provided verbal/tactile cueing for activities related to improving balance, coordination, kinesthetic sense, posture, motor skill, proprioception and motor activation to allow for proper function of cervical, scapular, scapulothoracic and UE control with self care, carrying, lifting, driving/computer work.      Home Exercise Program:    [x] (69495) Reviewed/Progressed HEP activities related to strengthening, flexibility, endurance, ROM of cervical, scapular, scapulothoracic and UE control with self care, reaching, carrying, lifting, house/yardwork, driving/computer work  [] (10581) Reviewed/Progressed HEP activities related to improving balance, coordination, kinesthetic sense, posture, motor skill, proprioception of cervical, scapular, scapulothoracic and UE control with self care, reaching, carrying, lifting, house/yardwork, driving/computer work      Manual Treatments:  PROM / STM / Oscillations-Mobs:  G-I, II, III, IV (PA's, Inf., Post.)  [x] (34191) Provided manual therapy to mobilize soft tissue/joints of cervical/CT, scapular GHJ and UE for the purpose of decreasing headache, modulating pain, promoting relaxation,  increasing ROM, reducing/eliminating soft tissue swelling/inflammation/restriction, improving soft tissue extensibility and allowing for proper ROM for normal function with self care, reaching, carrying, lifting, house/yardwork, driving/computer work        Approval Dates: thru 8/31  CPT Code Units Approved Units Used  Date Updated: 6/16   97 128 7               Charges:  Timed Code Treatment Minutes: 45 Total Treatment Minutes: 45     [] EVAL (LOW) 13764 (typically 20 minutes face-to-face)  [] EVAL (MOD) 13110 (typically 30 minutes face-to-face)  [] EVAL (HIGH) 99959 (typically 45 minutes face-to-face)  [] RE-EVAL     [] OI(76361) x     [] Dry needle 1 or 2 Muscles (10983)  [x] NMR (20162) x     [] Dry needle 3+ Muscles (85992)  [] Manual (85416) x     [x] Ultrasound (10238) x  [] TA (74621) x     [x] Mech Traction (09879)  [] ES(attended) (81231)     [] ES (un) (91064):   [] Vasopump (71307) [] Ionto (70640)   [] Other:    GOALS:  Patient stated goal:\"relieve pain\"  [x]? Progressing: []? Met: []? Not Met: []? Adjusted     Therapist goals for Patient:   Short Term Goals: To be achieved in: 2 -4 weeks  1. Independent in HEP and progression per patient tolerance, in order to prevent re-injury. [x]? Progressing: []? Met: []? Not Met: []? Adjusted  2. Patient will have a decrease in pain by 30-40% to facilitate improvement in movement, function, and ADLs as indicated by Functional Deficits. [x]? Progressing: []? Met: []? Not Met: []? Adjusted     Long Term Goals: To be achieved in: at d/c  1. Increase FOTO - neck functional outcome score from 33 to 56 to assist with reaching prior level of function. [x]? Progressing: []? Met: []? Not Met: []? Adjusted  2. Patient will have a decrease in pain by 60-70% to facilitate improvement in movement, function, and ADLs as indicated by Functional Deficits. [x]? Progressing: []? Met: []? Not Met: []? Adjusted  3. Patient will demonstrate increased AROM to [facility] of cervical/thoracic spine to allow for proper joint functioning as indicated by patients Functional Deficits. [x]? Progressing: []? Met: []? Not Met: []? Adjusted  4. Patient will demonstrate an increase in postural awareness and control and activation of  Deep cervical stabilizers to allow for proper functional mobility as indicated by patients Functional Deficits. [x]? Progressing: []? Met: []?  Not Met: []? Adjusted  5. Patient will return to sleep and self care without increased symptoms or restriction. [x]? Progressing: []? Met: []? Not Met: []? Adjusted  6. \"work and normal activities without pain\"  [x]? Progressing: []? Met: []? Not Met: []? Adjusted         ASSESSMENT:  Temporary relief of R UE s/s received after sessions with gentle ex, c-tx and US; inc relief noted with inc c-tx wt and addition of US    Treatment/Activity Tolerance:  [x] Patient tolerated treatment well [] Patient limited by fatique  [] Patient limited by pain  [] Patient limited by other medical complications  [] Other:     Overall Progression Towards Functional goals/ Treatment Progress Update:  [] Patient is progressing as expected towards functional goals listed. [] Progression is slowed due to complexities/Impairments listed. [] Progression has been slowed due to co-morbidities. [x] Plan just implemented, too soon to assess goals progression <30days   [] Goals require adjustment due to lack of progress  [] Patient is not progressing as expected and requires additional follow up with physician  [] Other    Prognosis for POC: [x] Good [] Fair  [] Poor    Patient requires continued skilled intervention: [x] Yes  [] No        PLAN: Assess inc to [facility] c-tx as indicated above  [] Continue per plan of care [] Alter current plan (see comments)  [x] Plan of care initiated [] Hold pending MD visit [] Discharge    Electronically signed by: Venu Brandt, PT MPT [id number]    Note: If patient does not return for scheduled/recommended follow up visits, this note will serve as a discharge from care along with the most recent update on progress.

## 2022-06-21 ENCOUNTER — HOSPITAL ENCOUNTER (OUTPATIENT)
Dept: PHYSICAL THERAPY | Age: 47
Setting detail: THERAPIES SERIES
Discharge: HOME | End: 2022-06-21
Payer: COMMERCIAL

## 2022-06-21 PROCEDURE — 97035 APP MDLTY 1+ULTRASOUND EA 15: CPT

## 2022-06-21 PROCEDURE — 97012 MECHANICAL TRACTION THERAPY: CPT

## 2022-06-21 NOTE — FLOWSHEET NOTE
[facility] [facility], [facility]  [address] [facility] [facility] [place], [facility]  Phone: [phone]   Fax:     [phone]    Physical Therapy Treatment Note/ Progress Report:     Date:  2022    Patient Name:  Fredy Rios    :  1975  MRN: [id number]    Pertinent Medical History: Additional Pertinent Hx: DM, HTN    Medical/Treatment Diagnosis Information:  · Diagnosis: M50.30 DDD cervical; M54.12 cervical radiculitis  · Treatment Diagnosis: pain in neck and R UE; weakness R UE limiting ADLs    Insurance/Certification information:  PT Insurance Information: caresource  Physician Information:  Referring Provider (secondary): Mala TRENT  Plan of care signed (Y/N): Yes    Date of Patient follow up with Physician:      Progress Report: []  Yes  [x]  No     Date Range for reporting period:  Beginnin2022  Ending:     Progress report due (10 Rx/or 30 days whichever is less): 31     Recertification due (POC duration/ or 90 days whichever is less):      Visit # Insurance/POC Allowable Auth Needed    [employer] auth  128 units thru  [x]Yes    []No     Functional Outcomes Measure:   Date Assessed: at eval  Test: FOTO  Score: 33    Pain level:  6-7/10     History of Injury:Patient stated complaint: Pt states woke up several weeks ago with R UE pain. Pain has worsened since then in neck and radiating to R UE with pain and numbness/tingling constantly. Pt rating pain at 5-6/10. Xray showing multilevel DDD C4-7. MD ordered MRI and is suggesting possible NATHAN, but pt needs to complete PT first for ins approval.     SUBJECTIVE:  See eval  : Felt a little bit of the stress taken off of her after the eval, but by night it had returned.  Not getting any sleep  : relief somewhat after last session, left with only tingling in fingertips and pain in shoulder blade and that lasted til evening but then pain and N/T returned by evening   6/21: 12 min late - Pain usually doesn't return until late in the evening on therapy days. Still has pain. Back (shoulder blade) hurts right now.  is more aware of her posture than she is herself. He corrects her when she has poor posture    OBJECTIVE:    Observation:    Test measurements:      RESTRICTIONS/PRECAUTIONS:     Exercises/Interventions:   Therapeutic Ex (96200)  Min:  Resistance/Repetitions Notes   R UT str  Stopped after 2, tingling increased                        Therapeutic Activity (00604) Min:                          NMR re-education (77026) Min: 2     Seated cervical retraction X 10     Scapular retraction  Shoulder rolls  Hep - does a lot b/c helps somewhat    Supine chin nod X 10         Manual Intervention (35119)  Min:      Man c-tx  L SB stretch 3 x 10 sec   3 x 10 sec  Relieved hand and forearm pain, tingling still in fingertips - proceed with [facility] c-tx              Modalities  Min: 25     % 1.5 w/cm to R neck/R UT/ R upper scap mm X 8 min  Dec R UE s/s, cont N/T in fingertips     [facility] c-tx with MHP  SIS 20# max/ 15# min   60 sec on/20 sec off  x12 min         After c-tx:  NT only in fingertips but less intense, shoulder blade pain gone    Inc c-tx wt seemed to help dec s/s, plan to try inc wt vs static hold or inc time          Other Therapeutic Activities:  Pt was educated on PT POC, Diagnosis, Prognosis, pathomechanics as well as frequency and duration of scheduling future physical therapy appointments. Time was also taken on this day to answer all patient questions and participation in PT. Reviewed appointment policy in detail with patient and patient verbalized understanding. X 8 min     Home Exercise Program:Patient was instructed in the following for HEP:     . Patient verbalized/demonstrated understanding and was issued written handout.     Therapeutic Exercise and NMR EXR  [x] (23023) Provided verbal/tactile cueing for activities related to strengthening, flexibility, endurance, ROM  for improvements in cervical, postural, scapular, scapulothoracic and UE control with self care, reaching, carrying, lifting, house/yardwork, driving/computer work.    [] (00588) Provided verbal/tactile cueing for activities related to improving balance, coordination, kinesthetic sense, posture, motor skill, proprioception  to assist with cervical, scapular, scapulothoracic and UE control with self care, reaching, carrying, lifting, house/yardwork, driving/computer work. Therapeutic Activities:    [x] (47265 or 75903) Provided verbal/tactile cueing for activities related to improving balance, coordination, kinesthetic sense, posture, motor skill, proprioception and motor activation to allow for proper function of cervical, scapular, scapulothoracic and UE control with self care, carrying, lifting, driving/computer work.      Home Exercise Program:    [x] (62796) Reviewed/Progressed HEP activities related to strengthening, flexibility, endurance, ROM of cervical, scapular, scapulothoracic and UE control with self care, reaching, carrying, lifting, house/yardwork, driving/computer work  [] (50859) Reviewed/Progressed HEP activities related to improving balance, coordination, kinesthetic sense, posture, motor skill, proprioception of cervical, scapular, scapulothoracic and UE control with self care, reaching, carrying, lifting, house/yardwork, driving/computer work      Manual Treatments:  PROM / STM / Oscillations-Mobs:  G-I, II, III, IV (PA's, Inf., Post.)  [x] (70043) Provided manual therapy to mobilize soft tissue/joints of cervical/CT, scapular GHJ and UE for the purpose of decreasing headache, modulating pain, promoting relaxation,  increasing ROM, reducing/eliminating soft tissue swelling/inflammation/restriction, improving soft tissue extensibility and allowing for proper ROM for normal function with self care, reaching, carrying, lifting, house/yardwork, driving/computer work        Approval Dates: thru 8/31  CPT Code Units Approved Units Used  Date Updated: 6/21   97 128 9               Charges:  Timed Code Treatment Minutes: 27   Total Treatment Minutes: 35     [] EVAL (LOW) 26708 (typically 20 minutes face-to-face)  [] EVAL (MOD) 76216 (typically 30 minutes face-to-face)  [] EVAL (HIGH) 41197 (typically 45 minutes face-to-face)  [] RE-EVAL     [] TL(08026) x     [] Dry needle 1 or 2 Muscles (87563)  [] NMR (36889) x     [] Dry needle 3+ Muscles (24177)  [] Manual (22628) x     [x] Ultrasound (61831) x  [] TA (86720) x     [x] Mech Traction (74326)  [] ES(attended) (50659)     [] ES (un) (62073):   [] Vasopump (03719) [] Ionto (40692)   [] Other:    GOALS:  Patient stated goal:\"relieve pain\"  [x]? Progressing: []? Met: []? Not Met: []? Adjusted     Therapist goals for Patient:   Short Term Goals: To be achieved in: 2 -4 weeks  1. Independent in HEP and progression per patient tolerance, in order to prevent re-injury. [x]? Progressing: []? Met: []? Not Met: []? Adjusted  2. Patient will have a decrease in pain by 30-40% to facilitate improvement in movement, function, and ADLs as indicated by Functional Deficits. [x]? Progressing: []? Met: []? Not Met: []? Adjusted     Long Term Goals: To be achieved in: at d/c  1. Increase FOTO - neck functional outcome score from 33 to 56 to assist with reaching prior level of function. [x]? Progressing: []? Met: []? Not Met: []? Adjusted  2. Patient will have a decrease in pain by 60-70% to facilitate improvement in movement, function, and ADLs as indicated by Functional Deficits. [x]? Progressing: []? Met: []? Not Met: []? Adjusted  3. Patient will demonstrate increased AROM to [facility] of cervical/thoracic spine to allow for proper joint functioning as indicated by patients Functional Deficits. [x]? Progressing: []? Met: []? Not Met: []? Adjusted  4.  Patient will demonstrate an increase in postural awareness and control and activation of  Deep cervical stabilizers to allow for proper functional mobility as indicated by patients Functional Deficits. [x]? Progressing: []? Met: []? Not Met: []? Adjusted  5. Patient will return to sleep and self care without increased symptoms or restriction. [x]? Progressing: []? Met: []? Not Met: []? Adjusted  6. \"work and normal activities without pain\"  [x]? Progressing: []? Met: []? Not Met: []? Adjusted         ASSESSMENT: Reiterated doing all of home exercises daily and not just shoulder rolls. Stressed getting to therapy on time so that her program can be progressed and she will obtain maximum benefits    Treatment/Activity Tolerance:  [x] Patient tolerated treatment well [] Patient limited by fatique  [] Patient limited by pain  [] Patient limited by other medical complications  [] Other:       Overall Progression Towards Functional goals/ Treatment Progress Update:  [] Patient is progressing as expected towards functional goals listed. [] Progression is slowed due to complexities/Impairments listed. [] Progression has been slowed due to co-morbidities. [x] Plan just implemented, too soon to assess goals progression <30days   [] Goals require adjustment due to lack of progress  [] Patient is not progressing as expected and requires additional follow up with physician  [] Other    Prognosis for POC: [x] Good [] Fair  [] Poor    Patient requires continued skilled intervention: [x] Yes  [] No        PLAN: Assess inc to [facility] c-tx as indicated above  [] Continue per plan of care [] Alter current plan (see comments)  [x] Plan of care initiated [] Hold pending MD visit [] Discharge    Electronically signed by: Anaid Ruiz, PTA [id number]    Note: If patient does not return for scheduled/recommended follow up visits, this note will serve as a discharge from care along with the most recent update on progress.

## 2022-06-23 ENCOUNTER — HOSPITAL ENCOUNTER (OUTPATIENT)
Dept: PHYSICAL THERAPY | Age: 47
Setting detail: THERAPIES SERIES
Discharge: HOME | End: 2022-06-23
Payer: COMMERCIAL

## 2022-06-23 PROCEDURE — 97012 MECHANICAL TRACTION THERAPY: CPT

## 2022-06-23 PROCEDURE — 97140 MANUAL THERAPY 1/> REGIONS: CPT

## 2022-06-23 PROCEDURE — 97035 APP MDLTY 1+ULTRASOUND EA 15: CPT

## 2022-06-27 ENCOUNTER — OFFICE VISIT (OUTPATIENT)
Dept: ORTHOPEDIC SURGERY | Age: 47
End: 2022-06-27
Payer: COMMERCIAL

## 2022-06-27 VITALS — WEIGHT: 228 LBS | BODY MASS INDEX: 36.64 KG/M2 | HEIGHT: 66 IN

## 2022-06-27 DIAGNOSIS — M54.12 CERVICAL RADICULITIS: ICD-10-CM

## 2022-06-27 DIAGNOSIS — M50.30 DDD (DEGENERATIVE DISC DISEASE), CERVICAL: Primary | ICD-10-CM

## 2022-06-27 PROCEDURE — G8427 DOCREV CUR MEDS BY ELIG CLIN: HCPCS | Performed by: PHYSICIAN ASSISTANT

## 2022-06-27 PROCEDURE — G8417 CALC BMI ABV UP PARAM F/U: HCPCS | Performed by: PHYSICIAN ASSISTANT

## 2022-06-27 PROCEDURE — 1036F TOBACCO NON-USER: CPT | Performed by: PHYSICIAN ASSISTANT

## 2022-06-27 PROCEDURE — 99213 OFFICE O/P EST LOW 20 MIN: CPT | Performed by: PHYSICIAN ASSISTANT

## 2022-06-27 RX ORDER — HYDROCODONE BITARTRATE AND ACETAMINOPHEN 5; 325 MG/1; MG/1
1 TABLET ORAL EVERY 6 HOURS PRN
Qty: 28 TABLET | Refills: 0 | Status: SHIPPED | OUTPATIENT
Start: 2022-06-27 | End: 2022-07-04

## 2022-06-28 ENCOUNTER — HOSPITAL ENCOUNTER (OUTPATIENT)
Dept: PHYSICAL THERAPY | Age: 47
Setting detail: THERAPIES SERIES
Discharge: HOME | End: 2022-06-28
Payer: COMMERCIAL

## 2022-06-28 PROCEDURE — 97012 MECHANICAL TRACTION THERAPY: CPT

## 2022-06-28 PROCEDURE — 97035 APP MDLTY 1+ULTRASOUND EA 15: CPT

## 2022-06-28 PROCEDURE — 97140 MANUAL THERAPY 1/> REGIONS: CPT

## 2022-06-28 NOTE — PROGRESS NOTES
Subjective:      Patient ID: Reynold Granados is a 41 y.o. female who is here for follow up evaluation of cervical radicular pain affecting right upper extremity. She finished her Medrol Dosepak and tried the gabapentin without relief of her neck pain and arm pain. She has completed 5 physical therapy sessions without relief. Pain 8/10 VAS. She describes the pain as aching, throbbing pain. The arm pain radiates to her right forearm.   She reports intermittent numbness and tingling in the right upper extremity.   She reports intermittent weakness of her right arm. She denies, lower extremity symptoms, gait abnormality and bowel or bladder dysfunction.          Review Of Systems:   She denies fevers or chills. Past Medical History:   Diagnosis Date    Diabetes mellitus ([address]     GERD (gastroesophageal reflux disease)     Hypertension        Family History   Problem Relation Age of Onset    Diabetes Mother     Cancer Mother     Hypertension Father     Diabetes Sister     Diabetes Brother        History reviewed. No pertinent surgical history. Social History     Occupational History    Not on file   Tobacco Use    Smoking status: Never Smoker    Smokeless tobacco: Never Used   Substance and Sexual Activity    Alcohol use: No    Drug use: No    Sexual activity: Not on file       Current Outpatient Medications   Medication Sig Dispense Refill    HYDROcodone-acetaminophen (NORCO) 5-325 MG per tablet Take 1 tablet by mouth every 6 hours as needed for Pain for up to 7 days. Take the least amount necessary to control pain. Do not operate machinery while taking this medication. Sedation Warning 28 tablet 0    gabapentin (NEURONTIN) 100 MG capsule Take 1 capsule by mouth 3 times daily for 30 days. Intended supply: 30 days 90 capsule 0     No current facility-administered medications for this visit.          Objective:     Ht 5' 6\" (1.676 m)   Wt 228 lb (103.4 kg)   BMI 36.80 kg/m²    General Exam:  She is well-developed and well-nourished, is in obvious pain and alert and oriented to person, place, and time. She demonstrates appropriate mood and affect. She walks with a normal gait.     HEENT:   Her cervical flexion, extension, and axial rotation are mildly reduced with pain. Her skin is warm and dry. She has moderate tenderness over her right lateral cervical spine and no obvious muscle spasm. The skin over her cervical spine is normal without surgical scar.      Upper extremities:  She has 5/5 strength of her interosseous muscles, wrist dorsiflexors and volarflexors, biceps, triceps, deltoids, and internal and external rotators of her shoulders, bilaterally. Her biceps, triceps, bracheoradialis, quadriceps and achilles reflexes are 2+, bilaterally. Sensation is intact to light touchfrom C6 to C8.   She has no clonus and negative Cornejo's bilaterally.         Examination of the right shoulder: There is no deformity. There is no erythema. There is no  soft tissue swelling. Deltoid region is not tender to palpation. Scapula/ trapezius is  tender to palpation. There is no weakness or pain with rotator cuff testing. Shoulder Active ROM -is near full without pain.     Lower extremities:  Normal DTR knees, no clonus.         X Rays: not performed in the office today:   Previous x-rays 5/5/2022 shows degenerative disc disease at C4-5, C5-6 and C6-7. Diagnosis:       ICD-10-CM    1. DDD (degenerative disc disease), cervical  M50.30 HYDROcodone-acetaminophen (NORCO) 5-325 MG per tablet     MRI CERVICAL SPINE WO CONTRAST   2. Cervical radiculitis  M54.12 HYDROcodone-acetaminophen (NORCO) 5-325 MG per tablet     EMG     MRI CERVICAL SPINE WO CONTRAST        Assessment and Plan:       Assessment:  Cervical radiculitis with degenerative disc disease cervical spine. No improvement reported with Decadron 6 mg tablets twice daily and gabapentin.   Completed 5 physical therapy sessions without relief. I had an extensive discussion with Ms. Fiona Cunningham regarding the natural history, etiology, and long term consequences of her condition. I have presented reasonable alternatives to the patient's proposed care, treatment, and services. Risks and benefits of the treatment options also reviewed in detail. I have outlined a treatment plan with them. She has had full opportunity to ask her questions. I have answered them all to her satisfaction. I feel that Ms. Fiona Cunningham understands our discussion today. Plan:  Medications-   Controlled substances monitoring: possible medication side effects, risk of tolerance and/or dependence, and alternative treatments discussed and OARRS report reviewed today- activity consistent with treatment plan. Norco 1 every 6 or 8 hours as needed pain. 7-day supply prescribed today. Further Imaging-  MRI cervical spine to evaluate for canal or foraminal stenosis. EMG right upper extremity. Follow up-after MRI/ EMG to review test results. Call or return to clinic if these symptoms worsen or fail to improve as anticipated. The total time spent on today's visit including reviewing test results, history, performance of physical exam, counseling/ education, ordering of medications, tests or procedures was 21 minutes. This time does not include completion of the medical record. This time excludes any time spent performing procedures or tests in the office. Mikie Cannon PA-C   Senior Physician Assistant   [facility]                                         Disclaimer: This note was generated with use of a verbal recognition program (DRAGON) and an attempt was made to check for errors. It is possible that there are still dictated errors within this office note. If so, please bring any significant errors to my attention for an addendum.   All efforts were made to ensure that this office note is accurate.

## 2022-06-29 ENCOUNTER — TELEPHONE (OUTPATIENT)
Dept: ORTHOPEDIC SURGERY | Age: 47
End: 2022-06-29

## 2022-06-29 NOTE — TELEPHONE ENCOUNTER
General Question     Subject: PATIENT NEEDS REFERRAL FOR A MRI SENT TO [facility].   Patien[facility]: [facility] Number: [phone]

## 2022-06-29 NOTE — TELEPHONE ENCOUNTER
Called and informed patient that mri was submitted yesterday to [address] [place] [address]. We will call her once we hear a response from them.

## 2022-06-30 ENCOUNTER — HOSPITAL ENCOUNTER (OUTPATIENT)
Dept: PHYSICAL THERAPY | Age: 47
Setting detail: THERAPIES SERIES
Discharge: HOME | End: 2022-06-30
Payer: COMMERCIAL

## 2022-06-30 NOTE — FLOWSHEET NOTE
[facility], [facility]  [address] [facility] [place], [facility]  Phone: [phone]   Fax:     [phone]    Physical Therapy  Cancellation/No-show Note  Patient Name:  Giovana Stroud  :  1975   Date:  2022  Cancelled visits to date: 0  No-shows to date: 1    Patient status for today's appointment patient:  []  Cancelled  []  Rescheduled appointment  [x]  No-show     Reason given by patient:  []  Patient ill  []  Conflicting appointment  []  No transportation    []  Conflict with work  [x]  No reason given  []  Other:     Comments:      Phone call information:   [x]  Phone call made today to patient at _ time at number provided:      []  Patient answered, conversation as follows:    [x]  Patient did not answer, message left as follows: Called patient regarding missed appointment. They were reminded of the attendance policy and might be discharged if they miss again. Reminded them of their next appointment time and date and to call if they are going to miss.     []  Phone call not made today     Electronically signed by:  Nadya Rodriguez, PTMPT [id number]

## 2022-07-05 ENCOUNTER — HOSPITAL ENCOUNTER (OUTPATIENT)
Dept: PHYSICAL THERAPY | Age: 47
Setting detail: THERAPIES SERIES
Discharge: HOME | End: 2022-07-05

## 2022-07-05 NOTE — FLOWSHEET NOTE
[facility], [facility]  [address] [facility] [place], [facility]  Phone: [phone]   Fax:     [phone]    Physical Therapy  Cancellation/No-show Note  Patient Name:  Mando Lechuga  :  1975   Date:  2022  Cancelled visits to date: 0  No-shows to date: 2    Patient status for today's appointment patient:  []  Cancelled  []  Rescheduled appointment  [x]  No-show     Reason given by patient:  []  Patient ill  []  Conflicting appointment  []  No transportation    []  Conflict with work  [x]  No reason given  []  Other:     Comments:      Phone call information:   []  Phone call made today to patient at _ time at number provided:      []  Patient answered, conversation as follows:    []  Patient did not answer, message left as follows:  [x]  Phone call not made today - pt discharged     Electronically signed by:  Pravin Bauman, PTMPT [id number]

## 2022-07-05 NOTE — PLAN OF CARE
[facility], [facility]  [address] [facility], [facility]  Phone: [phone]   Fax:     [phone]      Physical Therapy Discharge Summary    Dear Scar Chavez,    We had the pleasure of treating the following patient for physical therapy services at [address]. A summary of our findings can be found in the discharge summary below. This includes our final assessment. If you have any questions or concerns regarding these findings, please do not hesitate to contact me at the office phone number checked above.   Thank you for the referral.       Physician Signature:________________________________Date:__________________  By signing above, therapists plan is approved by physician          Patient: Dayron Morales   : 1975   MRN: [id number]  Referring Physician:        Evaluation Date: 2022        Medical/Treatment Diagnosis Information:  · Diagnosis: M50.30 DDD cervical; M54.12 cervical radiculitis  · Treatment Diagnosis: pain in neck and R UE; weakness R UE limiting ADLs       Insurance/Certification information:  [facility]     Date Range of Treatment: 22-22   Total visits:6    Functional Outcomes Measure: at discharge  Test:FOTO  Score: NT - pt NS'd for last 2 visits    SUBJECTIVE: pain relief x several hours after therapy, but doesn't last and is only temporary         Pain Scale: up to 9-10/10    Type: [x]Constant   []Intermitment  [x]Radiating []Localized  []other:     Functional Limitations: []Sitting []Standing []Walking    []Squatting []Stairs            [x]ADL's  [x]Driving []Sports/Recreation  []Other:    ASSESSMENT: cont functional limitations       Response to Treatment:   []Patient met all goals and has responded well to treatment and will continue HEP   []Patient should continue to improve in reasonable time if they continue HEP   []Patient has plateaued and is no longer responding to skilled PT intervention    [x]Patient is getting worse and would benefit from return to referring MD   []Patient unable to adhere to initial POC      GOALS: not met      PLAN:  . Refer back to MD for further testing     Reason for Discharge:  [] Goals Met   [] Patient did not return after initial evaluation   [x] Progress Plateaued  [] Missed _____ scheduled appointments   [] No insurance coverage [] Patient terminated therapy   [] MD discharged from PT [] Financial Limitations  [x] Other:no lasting improvement       Electronically signed by:  Candace Moscoso [place]  MPT [id number]

## 2022-07-12 ENCOUNTER — TELEPHONE (OUTPATIENT)
Dept: ORTHOPEDIC SURGERY | Age: 47
End: 2022-07-12

## 2022-07-12 NOTE — TELEPHONE ENCOUNTER
Called and let patient know that we still do not have a answer about her MRI. Told patient we will let her know as soon as we know.

## 2022-07-12 NOTE — TELEPHONE ENCOUNTER
General Question     Subject: PATIENT DID 6 PHYSICAL THERAPY, BUT IS HAVING TROUBLE GETTING HER MRI APPROVED. PLEASE ADVISE.   Patient: Rosalina Scott Number: [phone]

## 2022-07-19 NOTE — TELEPHONE ENCOUNTER
PATIENT CALL BACK AND WAS JUST NEEDING TO KNOW HAVE THE OFFICE HEARD ANYTHING REGARDING HER MRI SHE JUST NEED A CALL BACK. PLEASE ADVISE.

## 2022-07-20 DIAGNOSIS — M54.12 CERVICAL RADICULITIS: ICD-10-CM

## 2022-09-13 ENCOUNTER — OFFICE VISIT (OUTPATIENT)
Dept: ORTHOPEDIC SURGERY | Age: 47
End: 2022-09-13
Payer: COMMERCIAL

## 2022-09-13 DIAGNOSIS — M50.20 CERVICAL DISC HERNIATION: Primary | ICD-10-CM

## 2022-09-13 PROCEDURE — 99214 OFFICE O/P EST MOD 30 MIN: CPT | Performed by: ORTHOPAEDIC SURGERY

## 2022-09-13 PROCEDURE — G8427 DOCREV CUR MEDS BY ELIG CLIN: HCPCS | Performed by: ORTHOPAEDIC SURGERY

## 2022-09-13 PROCEDURE — G8417 CALC BMI ABV UP PARAM F/U: HCPCS | Performed by: ORTHOPAEDIC SURGERY

## 2022-09-13 PROCEDURE — 1036F TOBACCO NON-USER: CPT | Performed by: ORTHOPAEDIC SURGERY

## 2022-09-13 NOTE — PROGRESS NOTES
New Patient: CERVICAL SPINE    Referring Provider:  No ref. provider found    CHIEF COMPLAINT: Neck pain    HISTORY OF PRESENT ILLNESS:   Ms. Ruy Quintana is a pleasant 41 y.o. old female currently referred by Sima Adler [facility] for the evaluation of neck and right upper extremity pain. Her pain started started insidiously 5 months ago and has plateaued at an unacceptable level. She rates her pain 8/10. She notes intermittent numbness tingling and weakness of her right upper extremity. She denies loss of fine motor control and gait abnormality. Current/Past Treatment:   Physical Therapy: Yes  Chiropractic: No  Injection: No  Medications: Gabapentin and Norco    Past Medical History:   Past Medical History:   Diagnosis Date    Diabetes mellitus ([facility] [address]     GERD (gastroesophageal reflux disease)     Hypertension       Past Surgical History:     No past surgical history on file. Current Medications:     Current Outpatient Medications:     gabapentin (NEURONTIN) 100 MG capsule, Take 1 capsule by mouth 3 times daily for 30 days. Intended supply: 30 days, Disp: 90 capsule, Rfl: 0  Allergies:  Diflucan [fluconazole]  Social History:    reports that she has never smoked. She has never used smokeless tobacco. She reports that she does not drink alcohol and does not use drugs.   Family History:   Family History   Problem Relation Age of Onset    Diabetes Mother     Cancer Mother     Hypertension Father     Diabetes Sister     Diabetes Brother        REVIEW OF SYSTEMS: Full ROS noted & scanned   CONSTITUTIONAL: Denies unexplained weight loss, fevers, chills or fatigue  NEUROLOGICAL: Denies unsteady gait or progressive weakness  MUSCULOSKELETAL: Denies joint swelling or redness  PSYCHOLOGICAL: Denies anxiety, depression   SKIN: Denies skin changes, delayed healing, rash, itching   HEMATOLOGIC: Denies easy bleeding or bruising  ENDOCRINE: Denies excessive thirst, urination, heat/cold  RESPIRATORY: Denies current dyspnea, cough  GI: Denies nausea, vomiting, diarrhea   : Denies bowel or bladder issues       PHYSICAL EXAM:    Vitals: There were no vitals taken for this visit. GENERAL EXAM:  General Apparence: Patient is adequately groomed with no evidence of malnutrition. Orientation: The patient is oriented to time, place and person. Mood & Affect:The patient's mood and affect are appropriate   Vascular: Examination reveals no swelling tenderness in upper or lower extremities. Good capillary refill  Lymphatic: The lymphatic examination bilaterally reveals all areas to be without enlargement or induration  Sensation: Sensation is intact without deficit  Coordination/Balance: Good coordination     CERVICAL EXAMINATION:  Inspection: Local inspection shows no step-off or bruising. Cervical alignment is normal.     Palpation: No evidence of tenderness at the midline, and trapezius. Paraspinal tenderness is present. There is no step-off or paraspinal spasm. Range of Motion: Cervical flexion, extension, and rotation are mildly reduced with pain. Strength: Right triceps 4/5 otherwise 5/5 bilateral upper extremities   Special Tests:     Cornejo's negative bilaterally. Cubital and Carpal tunnel Tinel's negative bilaterally. Skin:There are no rashes, ulcerations or lesions in right & left upper extremities. Reflexes: Bilaterally triceps, biceps and brachioradialis are 2+. Clonus absent bilaterally at the feet. Gait & station: normal, patient ambulates without assistance     Additional Examinations:       RIGHT UPPER EXTREMITY:  Inspection/examination of the right upper extremity does not show any tenderness, deformity or injury. Range of motion is unremarkable. There is no gross instability. There are no rashes, ulcerations or lesions. Strength and tone are normal.  LEFT UPPER EXTREMITY: Inspection/examination of the left upper extremity does not show any tenderness, deformity or injury.  Range of motion is unremarkable. There is no gross instability. There are no rashes, ulcerations or lesions. Strength and tone are normal.    Diagnostic Testing:  I reviewed MRI images of her cervical spine from 8/25/2022 in the office today. Show a right paracentral disc herniation C6-C7    Impression:   Right paracentral disc herniation C6-C7    Plan:    We discussed treatment options including observation, physical therapy, medications, epidural injections and ACDF. We discussed the risks, benefits, and alternatives to surgery including the risks of nerve, spinal cord, esphogus or vessel injury, paralysis, spinal blood clot, spinal fluid leak, death, infection, need for additional spinal surgery, failure of surgery to alleviate her symptoms and worse symptoms following surgery, difficulty swallowing and hoarseness. She understands these risks and wishes to proceed with surgery. Her questions were answered.

## 2022-09-19 ENCOUNTER — TELEPHONE (OUTPATIENT)
Dept: ORTHOPEDIC SURGERY | Age: 47
End: 2022-09-19

## 2022-09-26 NOTE — TELEPHONE ENCOUNTER
DENIED  REASONING-NON COVERED BENEFIT  PEER TO PEER CALL [phone]   FOR APPEAL -[phone]  PIEDAD CAVAZOS [facility] SCANNED INTO EPIC

## 2022-10-03 NOTE — PROGRESS NOTES
[facility] time ____0600________        Surgery time ____0730________    Take the following medications with a sip of water: Follow your MD/Surgeons pre-procedure instructions regarding your medications     Do not eat or drink anything after 12:00 midnight prior to your surgery. This includes water chewing gum, mints and ice chips. You may brush your teeth and gargle the morning of your surgery, but do not swallow the water     Please see your family doctor/pediatrician for a history and physical and/or concerning medications. Bring any test results/reports from your physicians office. If you are under the care of a heart doctor or specialist doctor, please be aware that you may be asked to them for clearance    You may be asked to stop blood thinners such as Coumadin, Plavix, Fragmin, Lovenox, etc., or any anti-inflammatories such as:  Aspirin, Ibuprofen, Advil, Naproxen prior to your surgery. We also ask that you stop any OTC medications such as fish oil, vitamin E, glucosamine, garlic, Multivitamins, COQ 10, etc.    We ask that you do not smoke 24 hours prior to surgery  We ask that you do not  drink any alcoholic beverages 24 hours prior to surgery     You must make arrangements for a responsible adult to take you home after your surgery. For your safety you will not be allowed to leave alone or drive yourself home. Your surgery will be cancelled if you do not have a ride home. Also for your safety, it is strongly suggested that someone stay with you the first 24 hours after your surgery. A parent or legal guardian must accompany a child scheduled for surgery and plan to stay at the hospital until the child is discharged. Please do not bring other children with you. For your comfort, please wear simple loose fitting clothing to the hospital.  Please do not bring valuables.     Do not wear any make-up or nail polish on your fingers or toes      For your safety, please do not wear any jewelry or body piercing's on the day of surgery. All jewelry must be removed. If you have dentures, they will be removed before going to operating room. For your convenience, we will provide you with a container. If you wear contact lenses or glasses, they will be removed, please bring a case for them. If you have a living will and a durable power of  for healthcare, please bring in a copy. As part of our patient safety program to minimize surgical site infections, we ask you to do the following:    Please notify your surgeon if you develop any illness between         now and the  day of your surgery. This includes a cough, cold, fever, sore throat, nausea,         or vomiting, and diarrhea, etc.   Please notify your surgeon if you experience dizziness, shortness         of breath or blurred vision between now and the time of your surgery. Do not shave your operative site 96 hours prior to surgery. For face and neck surgery, men may use an electric razor 48 hours   prior to surgery. You may shower the night before surgery or the morning of   your surgery with an antibacterial soap. You will need to bring a photo ID and insurance card    [facility] has an onsite pharmacy, would you like to utilize our pharmacy     If you will be staying overnight and use a C-pap machine, please bring   your C-pap to hospital     Our goal is to provide you with excellent care, therefore, visitors will be limited to two(2) in the room at a time so that we may focus on providing this care for you. Please contact pre-admission testing if you have any further questions. [facility] phone number:  [phone] PAT fax number:  [phone]  Please note these are generalized instructions for all surgical cases, you may be provided with more specific instructions according to your surgery.     C-Difficile admission screening and protocol:       * Admitted with diarrhea? [] YES    [x]  NO     *Prior history of C-Diff. In last 3 months? [] YES    [x]  NO     *Antibiotic use in the past 6-8 weeks? []  NO    [x]  YES                 If yes, which ANTIBIOTIC AND REASON___UTI___     *Prior hospitalization or nursing home in the last month? []  YES    [x]  NO        SAFETY FIRST. .call before you fall

## 2022-10-05 ENCOUNTER — TELEPHONE (OUTPATIENT)
Dept: ORTHOPEDIC SURGERY | Age: 47
End: 2022-10-05

## 2022-10-05 NOTE — TELEPHONE ENCOUNTER
PA REQUESTED USING CORRECTED CODES THROUGH [facility]  URGENT REQUEST  [id number]

## 2022-10-05 NOTE — TELEPHONE ENCOUNTER
Other PATIENT WOULD LIKE OFFICE TO CONTACT INSURANCE COMPANY. PATIENT SAYS THAT PAPERWORK WAS SUBMITTED INCORRECTLY AND NEEDS TO BE RESUBMITTED WITH THE RIGHT CODE TODAY SO SURGERY CAN STAY ON SCHEDULE.  PATIENT PHONE: [phone]

## 2022-10-05 NOTE — TELEPHONE ENCOUNTER
General Question     Subject: REQ CALLBACK  Patient and /or Facility Request: Antonino Belle  Contact Number:  [phone]    REQ CALLBACK AT NUMBER LISTED

## 2022-10-07 ENCOUNTER — ANESTHESIA EVENT (OUTPATIENT)
Dept: OPERATING ROOM | Age: 47
End: 2022-10-07
Payer: COMMERCIAL

## 2022-10-10 ENCOUNTER — HOSPITAL ENCOUNTER (OUTPATIENT)
Age: 47
Setting detail: OUTPATIENT SURGERY
Discharge: HOME | End: 2022-10-10
Attending: ORTHOPAEDIC SURGERY | Admitting: ORTHOPAEDIC SURGERY
Payer: COMMERCIAL

## 2022-10-10 ENCOUNTER — ANESTHESIA (OUTPATIENT)
Dept: OPERATING ROOM | Age: 47
End: 2022-10-10
Payer: COMMERCIAL

## 2022-10-10 VITALS
OXYGEN SATURATION: 100 % | DIASTOLIC BLOOD PRESSURE: 73 MMHG | BODY MASS INDEX: 36.47 KG/M2 | HEART RATE: 82 BPM | TEMPERATURE: 97 F | HEIGHT: 65 IN | WEIGHT: 218.92 LBS | RESPIRATION RATE: 18 BRPM | SYSTOLIC BLOOD PRESSURE: 149 MMHG

## 2022-10-10 LAB
ANION GAP SERPL CALCULATED.3IONS-SCNC: 9 MMOL/L (ref 3–16)
BASOPHILS ABSOLUTE: 0 K/UL (ref 0–0.2)
BASOPHILS RELATIVE PERCENT: 0.5 %
BUN BLDV-MCNC: 9 MG/DL (ref 7–20)
CALCIUM SERPL-MCNC: 8.8 MG/DL (ref 8.3–10.6)
CHLORIDE BLD-SCNC: 101 MMOL/L (ref 99–110)
CO2: 26 MMOL/L (ref 21–32)
CREAT SERPL-MCNC: 0.6 MG/DL (ref 0.6–1.1)
EOSINOPHILS ABSOLUTE: 0 K/UL (ref 0–0.6)
EOSINOPHILS RELATIVE PERCENT: 0.1 %
GFR AFRICAN AMERICAN: >60
GFR NON-AFRICAN AMERICAN: >60
GLUCOSE BLD-MCNC: 204 MG/DL (ref 70–99)
HCT VFR BLD CALC: 33.7 % (ref 36–48)
HEMOGLOBIN: 11.2 G/DL (ref 12–16)
INR BLD: 1.03 (ref 0.87–1.14)
LYMPHOCYTES ABSOLUTE: 2.5 K/UL (ref 1–5.1)
LYMPHOCYTES RELATIVE PERCENT: 34.8 %
MCH RBC QN AUTO: 28 PG (ref 26–34)
MCHC RBC AUTO-ENTMCNC: 33.3 G/DL (ref 31–36)
MCV RBC AUTO: 84.1 FL (ref 80–100)
MONOCYTES ABSOLUTE: 0.6 K/UL (ref 0–1.3)
MONOCYTES RELATIVE PERCENT: 7.7 %
NEUTROPHILS ABSOLUTE: 4.1 K/UL (ref 1.7–7.7)
NEUTROPHILS RELATIVE PERCENT: 56.9 %
PDW BLD-RTO: 16 % (ref 12.4–15.4)
PLATELET # BLD: 255 K/UL (ref 135–450)
PMV BLD AUTO: 9.6 FL (ref 5–10.5)
POTASSIUM SERPL-SCNC: 4.3 MMOL/L (ref 3.5–5.1)
PREGNANCY, URINE: NEGATIVE
PROTHROMBIN TIME: 13.4 SEC (ref 11.7–14.5)
RBC # BLD: 4.01 M/UL (ref 4–5.2)
SODIUM BLD-SCNC: 136 MMOL/L (ref 136–145)
WBC # BLD: 7.2 K/UL (ref 4–11)

## 2022-10-10 PROCEDURE — 85025 COMPLETE CBC W/AUTO DIFF WBC: CPT

## 2022-10-10 PROCEDURE — 85610 PROTHROMBIN TIME: CPT

## 2022-10-10 PROCEDURE — 84703 CHORIONIC GONADOTROPIN ASSAY: CPT

## 2022-10-10 PROCEDURE — 80048 BASIC METABOLIC PNL TOTAL CA: CPT

## 2022-10-10 RX ORDER — SODIUM CHLORIDE 9 MG/ML
INJECTION, SOLUTION INTRAVENOUS PRN
Status: CANCELLED | OUTPATIENT
Start: 2022-10-10

## 2022-10-10 RX ORDER — SODIUM CHLORIDE 0.9 % (FLUSH) 0.9 %
5-40 SYRINGE (ML) INJECTION EVERY 12 HOURS SCHEDULED
Status: DISCONTINUED | OUTPATIENT
Start: 2022-10-10 | End: 2022-10-14 | Stop reason: HOSPADM

## 2022-10-10 RX ORDER — SODIUM CHLORIDE 9 MG/ML
INJECTION, SOLUTION INTRAVENOUS CONTINUOUS
Status: DISCONTINUED | OUTPATIENT
Start: 2022-10-10 | End: 2022-10-14 | Stop reason: HOSPADM

## 2022-10-10 RX ORDER — SODIUM CHLORIDE 9 MG/ML
INJECTION, SOLUTION INTRAVENOUS PRN
Status: DISCONTINUED | OUTPATIENT
Start: 2022-10-10 | End: 2022-10-14 | Stop reason: HOSPADM

## 2022-10-10 RX ORDER — ONDANSETRON 2 MG/ML
4 INJECTION INTRAMUSCULAR; INTRAVENOUS
Status: CANCELLED | OUTPATIENT
Start: 2022-10-10 | End: 2022-10-11

## 2022-10-10 RX ORDER — FENTANYL CITRATE 50 UG/ML
25 INJECTION, SOLUTION INTRAMUSCULAR; INTRAVENOUS EVERY 5 MIN PRN
Status: CANCELLED | OUTPATIENT
Start: 2022-10-10

## 2022-10-10 RX ORDER — SODIUM CHLORIDE 0.9 % (FLUSH) 0.9 %
5-40 SYRINGE (ML) INJECTION EVERY 12 HOURS SCHEDULED
Status: CANCELLED | OUTPATIENT
Start: 2022-10-10

## 2022-10-10 RX ORDER — SODIUM CHLORIDE 0.9 % (FLUSH) 0.9 %
5-40 SYRINGE (ML) INJECTION PRN
Status: DISCONTINUED | OUTPATIENT
Start: 2022-10-10 | End: 2022-10-14 | Stop reason: HOSPADM

## 2022-10-10 RX ORDER — SODIUM CHLORIDE 0.9 % (FLUSH) 0.9 %
5-40 SYRINGE (ML) INJECTION PRN
Status: CANCELLED | OUTPATIENT
Start: 2022-10-10

## 2022-10-10 ASSESSMENT — ENCOUNTER SYMPTOMS: SHORTNESS OF BREATH: 0

## 2022-10-10 ASSESSMENT — PAIN SCALES - GENERAL: PAINLEVEL_OUTOF10: 0

## 2022-10-10 NOTE — ANESTHESIA PRE PROCEDURE
Department of Anesthesiology  Preprocedure Note       Name:  Dana Lema   Age:  41 y.o.  :  1975                                          MRN:  [id number]         Date:  10/10/2022      Surgeon: Ceci Zapata):  cEho Argueta MD    Procedure: Procedure(s):  ANTERIOR CERVICAL DISCECTOMY FUSION, C6-7 WITH DEPUY PLATE AND SCREWS    Medications prior to admission:   Prior to Admission medications    Medication Sig Start Date End Date Taking? Authorizing Provider   mupirocin (BACTROBAN) 2 % ointment Apply 2cm to each nostril BID for 5 days prior to surgery 10/5/22   Charmaine Shirley PA-C       Current medications:    Current Facility-Administered Medications   Medication Dose Route Frequency Provider Last Rate Last Admin    ceFAZolin (ANCEF) 2,000 mg in dextrose 5 % 50 mL IVPB (mini-bag)  2,000 mg IntraVENous Once Echo Argueta MD        0.9 % sodium chloride infusion   IntraVENous Continuous Jj Yuan MD        sodium chloride flush 0.9 % injection 5-40 mL  5-40 mL IntraVENous 2 times per day Jj Yuan MD        sodium chloride flush 0.9 % injection 5-40 mL  5-40 mL IntraVENous PRN Jj Yuan MD        0.9 % sodium chloride infusion   IntraVENous PRN Jj Yuan MD           Allergies:     Allergies   Allergen Reactions    Flagyl [Metronidazole] Hives    Fluconazole Hives and Nausea And Vomiting              Problem List:    Patient Active Problem List   Diagnosis Code    Cellulitis of eyelid H00.039    Neck pain M54.2    DDD (degenerative disc disease), cervical M50.30    Cervical radiculitis M54.12       Past Medical History:        Diagnosis Date    Diabetes mellitus ([facility] [address]     currently not medically treated    GERD (gastroesophageal reflux disease)     Hypertension     Wears glasses        Past Surgical History:        Procedure Laterality Date    ENDOSCOPY, COLON, DIAGNOSTIC      GASTRIC BAND         Social History:    Social loose teeth, however mentions that after her EGD, her lower teeth were slightly loose     Pulmonary: breath sounds clear to auscultation      (-) COPD, asthma, shortness of breath, recent URI and sleep apnea                           Cardiovascular:    (+) hypertension (pt denies. Last several recorded BP are elevated):,     (-) valvular problems/murmurs, past MI, CAD, CABG/stent, dysrhythmias,  angina,  CHF, orthopnea and no pulmonary hypertension      Rhythm: regular  Rate: normal                    Neuro/Psych:   (+) neuromuscular disease (right arm numbness, pain, weakness):,    (-) seizures, TIA, CVA and headaches           GI/Hepatic/Renal:   (+) GERD (denies sx today):,      (-) PUD, hepatitis, liver disease, no renal disease and bowel prep       Endo/Other:    (+) Diabetes (borderline per patient, no meds,  today)Type II DM, , blood dyscrasia (anemia)::., .    (-) hypothyroidism, hyperthyroidism               Abdominal:   (+) obese,           Vascular: Other Findings:           Anesthesia Plan      general     ASA 3       Induction: intravenous. MIPS: Postoperative opioids intended and Prophylactic antiemetics administered. Anesthetic plan and risks discussed with patient. Plan discussed with CRNA. This pre-anesthesia assessment may be used as a history and physical.    DOS STAFF ADDENDUM:    Pt seen and examined, chart reviewed (including anesthesia, drug and allergy history). No interval changes to history and physical examination. Anesthetic plan, risks, benefits, alternatives, and personnel involved discussed with patient. Patient verbalized an understanding and agrees to proceed.       Lan Recinos MD  October 10, 2022  6:53 AM      Lan Recinos MD   10/10/2022

## 2022-10-10 NOTE — PROGRESS NOTES
[address] [facility] time__0600__________        Surgery time____0730________    Take the following medications with a sip of water: Follow your MD/Surgeons pre-procedure instructions regarding your medications     Do not eat or drink anything after 12:00 midnight prior to your surgery. This includes water chewing gum, mints and ice chips. You may brush your teeth and gargle the morning of your surgery, but do not swallow the water     Please see your family doctor/pediatrician for a history and physical and/or concerning medications. Bring any test results/reports from your physicians office. If you are under the care of a heart doctor or specialist doctor, please be aware that you may be asked to them for clearance    You may be asked to stop blood thinners such as Coumadin, Plavix, Fragmin, Lovenox, etc., or any anti-inflammatories such as:  Aspirin, Ibuprofen, Advil, Naproxen prior to your surgery. We also ask that you stop any OTC medications such as fish oil, vitamin E, glucosamine, garlic, Multivitamins, COQ 10, etc.    We ask that you do not smoke 24 hours prior to surgery  We ask that you do not  drink any alcoholic beverages 24 hours prior to surgery     You must make arrangements for a responsible adult to take you home after your surgery. For your safety you will not be allowed to leave alone or drive yourself home. Your surgery will be cancelled if you do not have a ride home. Also for your safety, it is strongly suggested that someone stay with you the first 24 hours after your surgery. A parent or legal guardian must accompany a child scheduled for surgery and plan to stay at the hospital until the child is discharged. Please do not bring other children with you. For your comfort, please wear simple loose fitting clothing to the hospital.  Please do not bring valuables.     Do not wear any make-up or nail polish on your fingers or toes      For your safety, please do not wear any jewelry or body piercing's on the day of surgery. All jewelry must be removed. If you have dentures, they will be removed before going to operating room. For your convenience, we will provide you with a container. If you wear contact lenses or glasses, they will be removed, please bring a case for them. If you have a living will and a durable power of  for healthcare, please bring in a copy. As part of our patient safety program to minimize surgical site infections, we ask you to do the following:    Please notify your surgeon if you develop any illness between         now and the  day of your surgery. This includes a cough, cold, fever, sore throat, nausea,         or vomiting, and diarrhea, etc.   Please notify your surgeon if you experience dizziness, shortness         of breath or blurred vision between now and the time of your surgery. Do not shave your operative site 96 hours prior to surgery. For face and neck surgery, men may use an electric razor 48 hours   prior to surgery. You may shower the night before surgery or the morning of   your surgery with an antibacterial soap. You will need to bring a photo ID and insurance card    [facility] has an onsite pharmacy, would you like to utilize our pharmacy     If you will be staying overnight and use a C-pap machine, please bring   your C-pap to hospital     Our goal is to provide you with excellent care, therefore, visitors will be limited to two(2) in the room at a time so that we may focus on providing this care for you. Please contact pre-admission testing if you have any further questions. [facility] phone number:  [phone] PAT fax number:  [phone]  Please note these are generalized instructions for all surgical cases, you may be provided with more specific instructions according to your surgery.     C-Difficile admission screening and protocol:       * Admitted with diarrhea? [] YES    []  NO     *Prior history of C-Diff. In last 3 months? [] YES    []  NO     *Antibiotic use in the past 6-8 weeks? []  NO    []  YES                 If yes, which ANTIBIOTIC AND REASON______     *Prior hospitalization or nursing home in the last month? []  YES    []  NO        SAFETY FIRST. .call before you fall

## 2022-10-14 ENCOUNTER — ANESTHESIA EVENT (OUTPATIENT)
Dept: OPERATING ROOM | Age: 47
End: 2022-10-14
Payer: COMMERCIAL

## 2022-10-15 NOTE — ANESTHESIA PRE PROCEDURE
Department of Anesthesiology  Preprocedure Note       Name:  Garth Beltran   Age:  41 y.o.  :  1975                                          MRN:  [id number]         Date:  10/17/2022      Surgeon: Lizandro Garibay):  Stephane Palmer MD    Procedure: Procedure(s):  ANTERIOR CERVICAL DISCECTOMY FUSION, C6-7 WITH DEPUY PLATE AND SCREWS    Medications prior to admission:   Prior to Admission medications    Medication Sig Start Date End Date Taking? Authorizing Provider   mupirocin (BACTROBAN) 2 % ointment Apply 2cm to each nostril BID for 5 days prior to surgery 10/5/22   Lamont Oliver PA-C       Current medications:    Current Facility-Administered Medications   Medication Dose Route Frequency Provider Last Rate Last Admin    ceFAZolin (ANCEF) 2,000 mg in dextrose 5 % 50 mL IVPB (mini-bag)  2,000 mg IntraVENous Once Stephane Palmer MD        0.9 % sodium chloride infusion   IntraVENous Continuous Juan Jose Villalba  mL/hr at 10/17/22 0648 New Bag at 10/17/22 0648    sodium chloride flush 0.9 % injection 5-40 mL  5-40 mL IntraVENous 2 times per day Juan Jose Villalba MD        sodium chloride flush 0.9 % injection 5-40 mL  5-40 mL IntraVENous PRN Juan Jose Villalba MD        0.9 % sodium chloride infusion   IntraVENous PRN Juan Jose Villalba MD           Allergies:     Allergies   Allergen Reactions    Flagyl [Metronidazole] Hives    Fluconazole Hives and Nausea And Vomiting              Problem List:    Patient Active Problem List   Diagnosis Code    Cellulitis of eyelid H00.039    Neck pain M54.2    DDD (degenerative disc disease), cervical M50.30    Cervical radiculitis M54.12       Past Medical History:        Diagnosis Date    Diabetes mellitus ([facility] [address]     currently not medically treated    GERD (gastroesophageal reflux disease)     Hypertension     Wears glasses        Past Surgical History:        Procedure Laterality Date    ENDOSCOPY, COLON, DIAGNOSTIC      GASTRIC BAND         Social History: Social History     Tobacco Use    Smoking status: Never    Smokeless tobacco: Never   Substance Use Topics    Alcohol use: No                                Counseling given: Not Answered      Vital Signs (Current):   Vitals:    10/10/22 1142 10/17/22 0641   BP:  (!) 140/84   Pulse:  68   Resp:  17   Temp:  97 °F (36.1 °C)   TempSrc:  Temporal   SpO2:  100%   Weight: 218 lb (98.9 kg) 221 lb 0.2 oz (100.3 kg)   Height: 5' 5\" (1.651 m) 5' 5\" (1.651 m)                                              BP Readings from Last 3 Encounters:   10/17/22 (!) 140/84   10/10/22 (!) 149/73   05/03/22 (!) 170/87       NPO Status: Time of last liquid consumption: 2300                        Time of last solid consumption: 2300                        Date of last liquid consumption: 10/16/22                        Date of last solid food consumption: 10/16/22    BMI:   Wt Readings from Last 3 Encounters:   10/17/22 221 lb 0.2 oz (100.3 kg)   10/10/22 218 lb 14.7 oz (99.3 kg)   06/27/22 228 lb (103.4 kg)     Body mass index is 36.78 kg/m².     CBC:   Lab Results   Component Value Date/Time    WBC 7.2 10/10/2022 06:47 AM    RBC 4.01 10/10/2022 06:47 AM    HGB 11.2 10/10/2022 06:47 AM    HCT 33.7 10/10/2022 06:47 AM    MCV 84.1 10/10/2022 06:47 AM    RDW 16.0 10/10/2022 06:47 AM     10/10/2022 06:47 AM       CMP:   Lab Results   Component Value Date/Time     10/10/2022 06:47 AM    K 4.3 10/10/2022 06:47 AM     10/10/2022 06:47 AM    CO2 26 10/10/2022 06:47 AM    BUN 9 10/10/2022 06:47 AM    CREATININE 0.6 10/10/2022 06:47 AM    GFRAA >60 10/10/2022 06:47 AM    LABGLOM >60 10/10/2022 06:47 AM    GLUCOSE 204 10/10/2022 06:47 AM    CALCIUM 8.8 10/10/2022 06:47 AM       POC Tests:   Recent Labs     10/17/22  0655   POCGLU 195*       Coags:   Lab Results   Component Value Date/Time    PROTIME 13.4 10/10/2022 06:47 AM    INR 1.03 10/10/2022 06:47 AM       HCG (If Applicable):   Lab Results   Component Value Date PREGTESTUR Negative 10/17/2022        ABGs: No results found for: PHART, PO2ART, WVD9BKY, AQI4EYU, BEART, K1OKDVIT     Type & Screen (If Applicable):  No results found for: LABABO, LABRH    Drug/Infectious Status (If Applicable):  No results found for: HIV, HEPCAB    COVID-19 Screening (If Applicable): No results found for: COVID19        Anesthesia Evaluation   no history of anesthetic complications (Lower incisors loose following attempt to remove tongue ring in past. ): Airway: Mallampati: III  TM distance: >3 FB   Neck ROM: full  Comment: Prior airway:  Airway Device 12/15/17; 0955; I.V., Modified Rapid Sequence; Standard; Laryngoscope Handle; Mac; 3; Oral; Grade I View; ETT; 7.5 mm; Cuffed; 6 mL; 20 cm; Lidocaine 2% Jelly; 1; CRNA; G.Jeb; Capnograph; Yes; 12/15/17; 1121 (Weight: ~115kg prior to gastric sleeve)  Mouth opening: > = 3 FB   Dental:      Comment: Lower incisors moderately loose per patient. Pulmonary:   (+) sleep apnea:      (-) COPD, asthma, rhonchi, wheezes, rales and not a current smoker                           Cardiovascular:    (+) hypertension:, hyperlipidemia    (-) orthopnea,  SAXENA and peripheral edema      Rhythm: regular  Rate: normal                 ROS comment: Exercise Stress Test (2017):  Conclusions   - Left ventricle: The cavity size was normal. Wall thickness was normal. Systolic function was normal. The estimated ejection fraction was in the range of 55% to 65%. Wall motion was normal; there were no regional wall motion abnormalities. Left ventricular diastolic function parameters were normal.   - Right ventricle: Systolic function was normal.   - Atrial septum: Echo contrast study showed no right-to-left atrial level shunt, at baseline or with provocation.   - Pulmonary arteries: Systolic pressure could not be accurately estimated. - Stress ECG conclusions: There were no stress arrhythmias or conduction abnormalities. The stress ECG was negative for ischemia.  [facility] scoring: exercise time of 6min; maximum ST deviation of 0mm; no angina; resulting score is 6. This score predicts a low risk of cardiac events. Impressions:   Normal study after maximal exercise. Neuro/Psych:      (-) seizures, TIA and CVA            ROS comment: Chronic neck pain with radiculopathy  DDD, disc herniation  Chronic right arm numbness, pain, weakness GI/Hepatic/Renal:   (+) GERD:, morbid obesity (BMI: 36.3 s/p gastric sleeve)     (-) liver disease and no renal disease       Endo/Other:    (+) Diabetes (last available HgbA1c: 10.4% in 2017; no meds currently following gastric sleeve)Type II DM, , : arthritis:., .    Blood dyscrasia: Borderline normal H&H: 11.2/33. 7. Abdominal:   (+) obese,     Abdomen: soft. Vascular: Other Findings: Intermittent RUE numbness / weakness reported in past.  stregnth grossly equal bilaterally. No deficit in lower extremity noted with plantar flexion/extension. Denies gait abnormality. Anesthesia Plan      general and TIVA     ASA 3     (Case rescheduled from 10/10/2022 due to vendor unavailability at that time. Will confer regarding evoked potential monitoring posted. Plan GETA / TIVA. NPO appropriate. Ms. Liliana Sneed denies active nausea / reflux. )  Induction: intravenous. MIPS: Postoperative opioids intended and Prophylactic antiemetics administered. Anesthetic plan and risks discussed with patient. Use of blood products discussed with patient whom consented to blood products. Blood Products Consent Comment: Chart notation regarding refusal of blood products is an error per patient. Plan discussed with CRNA. This pre-anesthesia assessment may be used as a history and physical.    DOS STAFF ADDENDUM:    Pt seen and examined, chart reviewed (including anesthesia, drug and allergy history). No interval changes to history and physical examination.   Anesthetic plan, risks, benefits, alternatives, and personnel involved discussed with patient. Patient verbalized an understanding and agrees to proceed.       Dara De La Torre MD  October 17, 2022  7:00 AM

## 2022-10-17 ENCOUNTER — HOSPITAL ENCOUNTER (OUTPATIENT)
Age: 47
Setting detail: OUTPATIENT SURGERY
Discharge: HOME | End: 2022-10-17
Attending: ORTHOPAEDIC SURGERY | Admitting: ORTHOPAEDIC SURGERY
Payer: COMMERCIAL

## 2022-10-17 ENCOUNTER — ANESTHESIA (OUTPATIENT)
Dept: OPERATING ROOM | Age: 47
End: 2022-10-17
Payer: COMMERCIAL

## 2022-10-17 ENCOUNTER — APPOINTMENT (OUTPATIENT)
Dept: GENERAL RADIOLOGY | Age: 47
End: 2022-10-17
Attending: ORTHOPAEDIC SURGERY
Payer: COMMERCIAL

## 2022-10-17 VITALS
BODY MASS INDEX: 36.82 KG/M2 | HEIGHT: 65 IN | DIASTOLIC BLOOD PRESSURE: 86 MMHG | OXYGEN SATURATION: 96 % | HEART RATE: 82 BPM | SYSTOLIC BLOOD PRESSURE: 165 MMHG | WEIGHT: 221.01 LBS | TEMPERATURE: 97 F | RESPIRATION RATE: 16 BRPM

## 2022-10-17 DIAGNOSIS — M50.20 CERVICAL DISC HERNIATION: Primary | ICD-10-CM

## 2022-10-17 LAB
ANION GAP SERPL CALCULATED.3IONS-SCNC: 9 MMOL/L (ref 3–16)
BASOPHILS ABSOLUTE: 0 K/UL (ref 0–0.2)
BASOPHILS RELATIVE PERCENT: 0.5 %
BUN BLDV-MCNC: 10 MG/DL (ref 7–20)
CALCIUM SERPL-MCNC: 8.9 MG/DL (ref 8.3–10.6)
CHLORIDE BLD-SCNC: 100 MMOL/L (ref 99–110)
CO2: 27 MMOL/L (ref 21–32)
CREAT SERPL-MCNC: 0.6 MG/DL (ref 0.6–1.1)
EOSINOPHILS ABSOLUTE: 0 K/UL (ref 0–0.6)
EOSINOPHILS RELATIVE PERCENT: 0.1 %
GFR AFRICAN AMERICAN: >60
GFR NON-AFRICAN AMERICAN: >60
GLUCOSE BLD-MCNC: 185 MG/DL (ref 70–99)
GLUCOSE BLD-MCNC: 195 MG/DL (ref 70–99)
HCT VFR BLD CALC: 33.8 % (ref 36–48)
HEMOGLOBIN: 11.1 G/DL (ref 12–16)
LYMPHOCYTES ABSOLUTE: 2.4 K/UL (ref 1–5.1)
LYMPHOCYTES RELATIVE PERCENT: 38.3 %
MCH RBC QN AUTO: 27.8 PG (ref 26–34)
MCHC RBC AUTO-ENTMCNC: 32.9 G/DL (ref 31–36)
MCV RBC AUTO: 84.3 FL (ref 80–100)
MONOCYTES ABSOLUTE: 0.4 K/UL (ref 0–1.3)
MONOCYTES RELATIVE PERCENT: 6.3 %
NEUTROPHILS ABSOLUTE: 3.4 K/UL (ref 1.7–7.7)
NEUTROPHILS RELATIVE PERCENT: 54.8 %
PDW BLD-RTO: 16.3 % (ref 12.4–15.4)
PERFORMED ON: ABNORMAL
PLATELET # BLD: 274 K/UL (ref 135–450)
PMV BLD AUTO: 9.4 FL (ref 5–10.5)
POTASSIUM SERPL-SCNC: 3.7 MMOL/L (ref 3.5–5.1)
PREGNANCY, URINE: NEGATIVE
RBC # BLD: 4.01 M/UL (ref 4–5.2)
SODIUM BLD-SCNC: 136 MMOL/L (ref 136–145)
WBC # BLD: 6.3 K/UL (ref 4–11)

## 2022-10-17 PROCEDURE — 2709999900 HC NON-CHARGEABLE SUPPLY: Performed by: ORTHOPAEDIC SURGERY

## 2022-10-17 PROCEDURE — 2500000003 HC RX 250 WO HCPCS

## 2022-10-17 PROCEDURE — 3600000014 HC SURGERY LEVEL 4 ADDTL 15MIN: Performed by: ORTHOPAEDIC SURGERY

## 2022-10-17 PROCEDURE — 3600000004 HC SURGERY LEVEL 4 BASE: Performed by: ORTHOPAEDIC SURGERY

## 2022-10-17 PROCEDURE — 7100000011 HC PHASE II RECOVERY - ADDTL 15 MIN: Performed by: ORTHOPAEDIC SURGERY

## 2022-10-17 PROCEDURE — 2580000003 HC RX 258: Performed by: ORTHOPAEDIC SURGERY

## 2022-10-17 PROCEDURE — 7100000000 HC PACU RECOVERY - FIRST 15 MIN: Performed by: ORTHOPAEDIC SURGERY

## 2022-10-17 PROCEDURE — 7100000001 HC PACU RECOVERY - ADDTL 15 MIN: Performed by: ORTHOPAEDIC SURGERY

## 2022-10-17 PROCEDURE — 80048 BASIC METABOLIC PNL TOTAL CA: CPT

## 2022-10-17 PROCEDURE — 6360000002 HC RX W HCPCS

## 2022-10-17 PROCEDURE — 3700000000 HC ANESTHESIA ATTENDED CARE: Performed by: ORTHOPAEDIC SURGERY

## 2022-10-17 PROCEDURE — 3700000001 HC ADD 15 MINUTES (ANESTHESIA): Performed by: ORTHOPAEDIC SURGERY

## 2022-10-17 PROCEDURE — 2580000003 HC RX 258

## 2022-10-17 PROCEDURE — A4217 STERILE WATER/SALINE, 500 ML: HCPCS | Performed by: ORTHOPAEDIC SURGERY

## 2022-10-17 PROCEDURE — 2500000003 HC RX 250 WO HCPCS: Performed by: ORTHOPAEDIC SURGERY

## 2022-10-17 PROCEDURE — C1713 ANCHOR/SCREW BN/BN,TIS/BN: HCPCS | Performed by: ORTHOPAEDIC SURGERY

## 2022-10-17 PROCEDURE — C1762 CONN TISS, HUMAN(INC FASCIA): HCPCS | Performed by: ORTHOPAEDIC SURGERY

## 2022-10-17 PROCEDURE — 7100000010 HC PHASE II RECOVERY - FIRST 15 MIN: Performed by: ORTHOPAEDIC SURGERY

## 2022-10-17 PROCEDURE — 6360000002 HC RX W HCPCS: Performed by: ORTHOPAEDIC SURGERY

## 2022-10-17 PROCEDURE — 2580000003 HC RX 258: Performed by: ANESTHESIOLOGY

## 2022-10-17 PROCEDURE — 72040 X-RAY EXAM NECK SPINE 2-3 VW: CPT

## 2022-10-17 PROCEDURE — 3209999900 FLUORO FOR SURGICAL PROCEDURES

## 2022-10-17 PROCEDURE — 2720000010 HC SURG SUPPLY STERILE: Performed by: ORTHOPAEDIC SURGERY

## 2022-10-17 PROCEDURE — 84703 CHORIONIC GONADOTROPIN ASSAY: CPT

## 2022-10-17 PROCEDURE — 85025 COMPLETE CBC W/AUTO DIFF WBC: CPT

## 2022-10-17 DEVICE — SCREW SPNL L14MM DIA4MM ANT CERV TI SELF DRL VAR ANG FULL: Type: IMPLANTABLE DEVICE | Site: NECK | Status: FUNCTIONAL

## 2022-10-17 DEVICE — PLATE SPNL L14MM ANT CERV TI 1 LEV SKYLINE: Type: IMPLANTABLE DEVICE | Site: NECK | Status: FUNCTIONAL

## 2022-10-17 RX ORDER — METHOCARBAMOL 750 MG/1
750 TABLET, FILM COATED ORAL 3 TIMES DAILY
Qty: 30 TABLET | Refills: 0 | Status: SHIPPED | OUTPATIENT
Start: 2022-10-17 | End: 2022-10-27

## 2022-10-17 RX ORDER — FAMOTIDINE 10 MG/ML
INJECTION, SOLUTION INTRAVENOUS PRN
Status: DISCONTINUED | OUTPATIENT
Start: 2022-10-17 | End: 2022-10-17 | Stop reason: SDUPTHER

## 2022-10-17 RX ORDER — OXYCODONE HYDROCHLORIDE AND ACETAMINOPHEN 5; 325 MG/1; MG/1
2 TABLET ORAL EVERY 4 HOURS PRN
Qty: 35 TABLET | Refills: 0 | Status: SHIPPED | OUTPATIENT
Start: 2022-10-17 | End: 2022-11-22

## 2022-10-17 RX ORDER — LABETALOL HYDROCHLORIDE 5 MG/ML
10 INJECTION, SOLUTION INTRAVENOUS
Status: DISCONTINUED | OUTPATIENT
Start: 2022-10-17 | End: 2022-10-17 | Stop reason: HOSPADM

## 2022-10-17 RX ORDER — SODIUM CHLORIDE 9 MG/ML
INJECTION, SOLUTION INTRAVENOUS CONTINUOUS
Status: DISCONTINUED | OUTPATIENT
Start: 2022-10-17 | End: 2022-10-17 | Stop reason: HOSPADM

## 2022-10-17 RX ORDER — PROPOFOL 10 MG/ML
INJECTION, EMULSION INTRAVENOUS PRN
Status: DISCONTINUED | OUTPATIENT
Start: 2022-10-17 | End: 2022-10-17 | Stop reason: SDUPTHER

## 2022-10-17 RX ORDER — PROCHLORPERAZINE EDISYLATE 5 MG/ML
5 INJECTION INTRAMUSCULAR; INTRAVENOUS
Status: DISCONTINUED | OUTPATIENT
Start: 2022-10-17 | End: 2022-10-17 | Stop reason: HOSPADM

## 2022-10-17 RX ORDER — LIDOCAINE HYDROCHLORIDE 20 MG/ML
INJECTION, SOLUTION EPIDURAL; INFILTRATION; INTRACAUDAL; PERINEURAL PRN
Status: DISCONTINUED | OUTPATIENT
Start: 2022-10-17 | End: 2022-10-17 | Stop reason: SDUPTHER

## 2022-10-17 RX ORDER — LORAZEPAM 2 MG/ML
0.5 INJECTION INTRAMUSCULAR PRN
Status: DISCONTINUED | OUTPATIENT
Start: 2022-10-17 | End: 2022-10-17 | Stop reason: HOSPADM

## 2022-10-17 RX ORDER — SODIUM CHLORIDE 0.9 % (FLUSH) 0.9 %
5-40 SYRINGE (ML) INJECTION EVERY 12 HOURS SCHEDULED
Status: DISCONTINUED | OUTPATIENT
Start: 2022-10-17 | End: 2022-10-17 | Stop reason: HOSPADM

## 2022-10-17 RX ORDER — DEXAMETHASONE SODIUM PHOSPHATE 4 MG/ML
INJECTION, SOLUTION INTRA-ARTICULAR; INTRALESIONAL; INTRAMUSCULAR; INTRAVENOUS; SOFT TISSUE PRN
Status: DISCONTINUED | OUTPATIENT
Start: 2022-10-17 | End: 2022-10-17 | Stop reason: SDUPTHER

## 2022-10-17 RX ORDER — REMIFENTANIL HYDROCHLORIDE 1 MG/ML
INJECTION, POWDER, LYOPHILIZED, FOR SOLUTION INTRAVENOUS CONTINUOUS PRN
Status: DISCONTINUED | OUTPATIENT
Start: 2022-10-17 | End: 2022-10-17 | Stop reason: SDUPTHER

## 2022-10-17 RX ORDER — ONDANSETRON 2 MG/ML
INJECTION INTRAMUSCULAR; INTRAVENOUS PRN
Status: DISCONTINUED | OUTPATIENT
Start: 2022-10-17 | End: 2022-10-17 | Stop reason: SDUPTHER

## 2022-10-17 RX ORDER — ROCURONIUM BROMIDE 10 MG/ML
INJECTION, SOLUTION INTRAVENOUS PRN
Status: DISCONTINUED | OUTPATIENT
Start: 2022-10-17 | End: 2022-10-17 | Stop reason: SDUPTHER

## 2022-10-17 RX ORDER — IPRATROPIUM BROMIDE AND ALBUTEROL SULFATE 2.5; .5 MG/3ML; MG/3ML
1 SOLUTION RESPIRATORY (INHALATION)
Status: DISCONTINUED | OUTPATIENT
Start: 2022-10-17 | End: 2022-10-17 | Stop reason: HOSPADM

## 2022-10-17 RX ORDER — ONDANSETRON 2 MG/ML
4 INJECTION INTRAMUSCULAR; INTRAVENOUS
Status: DISCONTINUED | OUTPATIENT
Start: 2022-10-17 | End: 2022-10-17 | Stop reason: HOSPADM

## 2022-10-17 RX ORDER — MAGNESIUM HYDROXIDE 1200 MG/15ML
LIQUID ORAL
Status: COMPLETED | OUTPATIENT
Start: 2022-10-17 | End: 2022-10-17

## 2022-10-17 RX ORDER — MIDAZOLAM HYDROCHLORIDE 1 MG/ML
INJECTION INTRAMUSCULAR; INTRAVENOUS PRN
Status: DISCONTINUED | OUTPATIENT
Start: 2022-10-17 | End: 2022-10-17 | Stop reason: SDUPTHER

## 2022-10-17 RX ORDER — SODIUM CHLORIDE 9 MG/ML
INJECTION, SOLUTION INTRAVENOUS PRN
Status: DISCONTINUED | OUTPATIENT
Start: 2022-10-17 | End: 2022-10-17 | Stop reason: HOSPADM

## 2022-10-17 RX ORDER — CEPHALEXIN 500 MG/1
500 CAPSULE ORAL EVERY 6 HOURS
Qty: 2 CAPSULE | Refills: 0 | Status: SHIPPED | OUTPATIENT
Start: 2022-10-17 | End: 2022-10-18

## 2022-10-17 RX ORDER — SODIUM CHLORIDE 0.9 % (FLUSH) 0.9 %
5-40 SYRINGE (ML) INJECTION PRN
Status: DISCONTINUED | OUTPATIENT
Start: 2022-10-17 | End: 2022-10-17 | Stop reason: HOSPADM

## 2022-10-17 RX ORDER — HYDRALAZINE HYDROCHLORIDE 20 MG/ML
10 INJECTION INTRAMUSCULAR; INTRAVENOUS
Status: DISCONTINUED | OUTPATIENT
Start: 2022-10-17 | End: 2022-10-17 | Stop reason: HOSPADM

## 2022-10-17 RX ORDER — FENTANYL CITRATE 50 UG/ML
INJECTION, SOLUTION INTRAMUSCULAR; INTRAVENOUS PRN
Status: DISCONTINUED | OUTPATIENT
Start: 2022-10-17 | End: 2022-10-17 | Stop reason: SDUPTHER

## 2022-10-17 RX ORDER — DOCUSATE SODIUM 100 MG/1
100 CAPSULE, LIQUID FILLED ORAL 2 TIMES DAILY PRN
Qty: 30 CAPSULE | Refills: 1 | Status: SHIPPED | OUTPATIENT
Start: 2022-10-17

## 2022-10-17 RX ORDER — KETAMINE HCL IN NACL, ISO-OSM 100MG/10ML
SYRINGE (ML) INJECTION PRN
Status: DISCONTINUED | OUTPATIENT
Start: 2022-10-17 | End: 2022-10-17 | Stop reason: SDUPTHER

## 2022-10-17 RX ORDER — GLYCOPYRROLATE 0.2 MG/ML
INJECTION INTRAMUSCULAR; INTRAVENOUS PRN
Status: DISCONTINUED | OUTPATIENT
Start: 2022-10-17 | End: 2022-10-17 | Stop reason: SDUPTHER

## 2022-10-17 RX ORDER — DEXMEDETOMIDINE HYDROCHLORIDE 100 UG/ML
INJECTION, SOLUTION INTRAVENOUS PRN
Status: DISCONTINUED | OUTPATIENT
Start: 2022-10-17 | End: 2022-10-17 | Stop reason: SDUPTHER

## 2022-10-17 RX ORDER — BUPIVACAINE HYDROCHLORIDE AND EPINEPHRINE 5; 5 MG/ML; UG/ML
INJECTION, SOLUTION EPIDURAL; INTRACAUDAL; PERINEURAL
Status: COMPLETED | OUTPATIENT
Start: 2022-10-17 | End: 2022-10-17

## 2022-10-17 RX ORDER — METHOCARBAMOL 100 MG/ML
INJECTION, SOLUTION INTRAMUSCULAR; INTRAVENOUS PRN
Status: DISCONTINUED | OUTPATIENT
Start: 2022-10-17 | End: 2022-10-17 | Stop reason: SDUPTHER

## 2022-10-17 RX ORDER — SUCCINYLCHOLINE/SOD CL,ISO/PF 200MG/10ML
SYRINGE (ML) INTRAVENOUS PRN
Status: DISCONTINUED | OUTPATIENT
Start: 2022-10-17 | End: 2022-10-17 | Stop reason: SDUPTHER

## 2022-10-17 RX ORDER — MEPERIDINE HYDROCHLORIDE 25 MG/ML
12.5 INJECTION INTRAMUSCULAR; INTRAVENOUS; SUBCUTANEOUS EVERY 5 MIN PRN
Status: DISCONTINUED | OUTPATIENT
Start: 2022-10-17 | End: 2022-10-17 | Stop reason: HOSPADM

## 2022-10-17 RX ORDER — PROPOFOL 10 MG/ML
INJECTION, EMULSION INTRAVENOUS CONTINUOUS PRN
Status: DISCONTINUED | OUTPATIENT
Start: 2022-10-17 | End: 2022-10-17 | Stop reason: SDUPTHER

## 2022-10-17 RX ADMIN — DEXMEDETOMIDINE HYDROCHLORIDE 4 MCG: 100 INJECTION, SOLUTION INTRAVENOUS at 09:30

## 2022-10-17 RX ADMIN — FENTANYL CITRATE 50 MCG: 50 INJECTION INTRAMUSCULAR; INTRAVENOUS at 07:50

## 2022-10-17 RX ADMIN — DEXAMETHASONE SODIUM PHOSPHATE 10 MG: 4 INJECTION, SOLUTION INTRAMUSCULAR; INTRAVENOUS at 07:47

## 2022-10-17 RX ADMIN — CEFAZOLIN 2000 MG: 2 INJECTION, POWDER, FOR SOLUTION INTRAMUSCULAR; INTRAVENOUS at 07:47

## 2022-10-17 RX ADMIN — ROCURONIUM BROMIDE 5 MG: 10 INJECTION INTRAVENOUS at 07:43

## 2022-10-17 RX ADMIN — LIDOCAINE HYDROCHLORIDE 100 MG: 20 INJECTION, SOLUTION EPIDURAL; INFILTRATION; INTRACAUDAL; PERINEURAL at 07:43

## 2022-10-17 RX ADMIN — METHOCARBAMOL 250 MG: 100 INJECTION INTRAMUSCULAR; INTRAVENOUS at 08:46

## 2022-10-17 RX ADMIN — METHOCARBAMOL 250 MG: 100 INJECTION INTRAMUSCULAR; INTRAVENOUS at 08:57

## 2022-10-17 RX ADMIN — PROPOFOL 180 MCG/KG/MIN: 10 INJECTION, EMULSION INTRAVENOUS at 07:45

## 2022-10-17 RX ADMIN — REMIFENTANIL HYDROCHLORIDE 0.05 MCG/KG/MIN: 1 INJECTION, POWDER, LYOPHILIZED, FOR SOLUTION INTRAVENOUS at 07:47

## 2022-10-17 RX ADMIN — FENTANYL CITRATE 50 MCG: 50 INJECTION INTRAMUSCULAR; INTRAVENOUS at 07:43

## 2022-10-17 RX ADMIN — Medication 140 MG: at 07:43

## 2022-10-17 RX ADMIN — MIDAZOLAM 2 MG: 1 INJECTION INTRAMUSCULAR; INTRAVENOUS at 07:37

## 2022-10-17 RX ADMIN — ONDANSETRON 4 MG: 2 INJECTION INTRAMUSCULAR; INTRAVENOUS at 09:30

## 2022-10-17 RX ADMIN — PROPOFOL 200 MG: 10 INJECTION, EMULSION INTRAVENOUS at 07:43

## 2022-10-17 RX ADMIN — METHOCARBAMOL 250 MG: 100 INJECTION INTRAMUSCULAR; INTRAVENOUS at 09:08

## 2022-10-17 RX ADMIN — Medication 10 MG: at 07:54

## 2022-10-17 RX ADMIN — HYDROMORPHONE HYDROCHLORIDE 0.5 MG: 1 INJECTION, SOLUTION INTRAMUSCULAR; INTRAVENOUS; SUBCUTANEOUS at 08:46

## 2022-10-17 RX ADMIN — FAMOTIDINE 20 MG: 10 INJECTION, SOLUTION INTRAVENOUS at 07:37

## 2022-10-17 RX ADMIN — DEXMEDETOMIDINE HYDROCHLORIDE 4 MCG: 100 INJECTION, SOLUTION INTRAVENOUS at 09:16

## 2022-10-17 RX ADMIN — SODIUM CHLORIDE: 9 INJECTION, SOLUTION INTRAVENOUS at 06:48

## 2022-10-17 RX ADMIN — Medication 20 MG: at 07:43

## 2022-10-17 RX ADMIN — PROPOFOL 70 MG: 10 INJECTION, EMULSION INTRAVENOUS at 08:46

## 2022-10-17 RX ADMIN — PHENYLEPHRINE HYDROCHLORIDE 150 MCG: 10 INJECTION INTRAVENOUS at 08:18

## 2022-10-17 RX ADMIN — GLYCOPYRROLATE 0.1 MG: 1 INJECTION INTRAMUSCULAR; INTRAVENOUS at 07:37

## 2022-10-17 RX ADMIN — DEXMEDETOMIDINE HYDROCHLORIDE 4 MCG: 100 INJECTION, SOLUTION INTRAVENOUS at 09:05

## 2022-10-17 ASSESSMENT — LIFESTYLE VARIABLES: SMOKING_STATUS: 0

## 2022-10-17 ASSESSMENT — PAIN - FUNCTIONAL ASSESSMENT: PAIN_FUNCTIONAL_ASSESSMENT: 0-10

## 2022-10-17 NOTE — OP NOTE
Operative Note      Patient: Antionette Guidry  YOB: 1975  MRN: [id number]    Date of Procedure: 10/17/2022    Pre-Op Diagnosis: CERVICAL DISC HERNIATION    Post-Op Diagnosis: Same       Procedure(s):  ANTERIOR CERVICAL DISCECTOMY FUSION, C6-7 WITH DEPUY PLATE AND SCREWS    Surgeon(s):  July Hope MD    Assistant:   Surgical Assistant: Sam Hook    Anesthesia: General    Estimated Blood Loss (mL): less than 014     Complications: None    Specimens:   * No specimens in log *    Implants:  Implant Name Type Inv. Item Serial No.  Lot No. LRB No. Used Action   [id number] - [id number]   [id number]   N/A 1 Implanted   PLATE SPNL Q81OD ANT CERV TI 1 LEV SKYLINE - [id number]  PLATE SPNL [id number] ANT CERV TI 1 LEV SKYLINE  [employer]WD  N/A 1 Implanted   SCREW SPNL L14MM DIA4MM ANT CERV TI SELF DRL PRAMOD ANG FULL - [id number]  SCREW SPNL L14MM DIA4MM ANT CERV TI SELF DRL PRAMOD ANG FULL  [employer]WD  N/A 4 Implanted         Drains:   Closed/Suction Drain Anterior Neck Bulb (Active)       Findings: HNP    INDICATIONS FOR SURGERY:   Antionette Guidry is a 41 y.o. female with neck and left arm pain. The symptoms failed to respond to conservative intervention. An MRI scan was performed and this showed evidence of a disk herniation at the C5/C6 level on the left. Having failed conservative management and experiencing persistent symptoms, the patient elected to proceed ahead with the surgical option ACDF at C6-7. DETAILS OF PROCEDURE:  The patient was brought to the operating room, placed supine on the sharda table and placed under general anesthesia. Evokes stimulating and monitoring needles were placed. A gel roll was placed under the scapula, being careful to avoid cervical extension. A donut pad was placed under the head. Wrist restraints were loosely applied to the wrists and the arms were padded and tucked at the sides.  All bony prominences were inspected and padded prior to sterile draping. I injected the skin with 0.5% Marcaine with epi. Using a #10 blade knife, the skin was incised in an existing transverse crease on the right side of the neck. A plane was developed between the skin and platysma muscle with scissors. The platysma muscle was grasped with forceps and split longitudinally. The external and anterior jugular veins were identified and protected. A plane was developed deep to the platysma. The anterior border of the sternocleidomastoid was identified and blunt dissection was performed just medial to the sternocleidomastoid muscle and the carotid sheath and just lateral to the strap muscles, trachea, esophagus and thyroid. Vessels and nerves coursing transversely across that interval were identified and protected. The end of an angled retractor was placed deep to the esophagus and the structures medial to the carotid sheath were gently retracted from the midline. I palpated the spine and identified the longus coli muscles. A pin was placed near the superior endplate of what I guessed was C6 and fluoroscopy was used to confirm the level. I marked the proper disc by making a horizontal cut in the disc with a bovie. I removed the pin and used bipolar electrocautery along the medial portions of the longus coli muscles just rostral and caudal to the disc. The longus coli muscles were carefully elevated with straight and angled curettes and the blades of a blackbelt retractor were placed deep the longus coli muscles. Those blades were attached to the retractor. Using the operating microscope and a 15 blade knife I incised the edges of the C6-7 annulus from the endplates. I used a straight curette to free the disc from the endplates and the disc was grasped and removed with a pituitary. Yasmany distraction pins were then placed in the vertebrae directly rostral and caudal to the disc and the retractor was placed.  I removed the anterior inferior lip of the rostral vertebra with a #3 kerrison. I removed disc material back to the PLL with straight and angled curettes. I identified a rent in the PLL. I carefully enlarged that rent with an angled 6-0 curette. I used a #1 then a #2 kerrison to remove the PLL. The dura was identified and protected. I removed a portion of the posterior uncus on each side and performed foraminotomies with a 6-0 curette and a #2 kerrison. A microdissector could easily be passed into the foramen and I could identify exiting nerves. Having completed the spinal cord and nerve decompression, I used the midas to bur the vertebral endplates. This contoured the endplates and exposed bleeding bone. I then used the sizers to determine the best size allograft to insert and to check the fit. The appropriate allograft was impacted into the disc space and the betito pins were removed. Bone was was packed into the pin sites. I then selected the shortest plate that spanned the disc space and onto the adjacent portions of the rostral and caudal vertebrae. The plate was positioned midline. I used a guide and an awl to create starter holes. 14 mm screws were then advanced through the holes in the plate and into the  holes. The screws were tightened and lateral fluoroscopy confirmed good position of the bone graft, plate, and screws. Evokes identified no abnormal changes in monitoring. The Blackbelt retractor blades were removed and I examined all the structures in the wound for injury. Hemostasis was confirmed. I placed the tip of a drain over the spine and brought the second end of the drain out through a second stab wound. I closed the platysma and subcutaneous tissues with interrupted 3-0 Vicryl sutures. I then closed the skin with a  a 4-0 Vicryl subcuticular suture. Sterile dressing were then applied. The patient was extubated in the operating room and transferred to the recovery room in stable condition. There were no complications. Bret A. Adine Closs, M.D.        Electronically signed by Swathi Gallardo MD on 10/17/2022 at 9:52 AM

## 2022-10-17 NOTE — DISCHARGE INSTR - DIET
Good nutrition is important when healing from an illness, injury, or surgery. Follow any nutrition recommendations given to you during your hospital stay. If you were given an oral nutrition supplement while in the hospital, continue to take this supplement at home. You can take it with meals, in-between meals, and/or before bedtime. These supplements can be purchased at most local grocery stores, pharmacies, and chain [employer]-stores. If you have any questions about your diet or nutrition, call the hospital and ask for the dietitian.   Advance to your regular diet as tolerated

## 2022-10-17 NOTE — DISCHARGE INSTRUCTIONS
[address] [place] [address] [facility]    Dr. Amaya Rosenthal Operative Cervical Spine Surgery Instructions    Patients with arm pain before surgery generally note marked improvement in their arm symptoms soon after surgery. However it may take weeks for complete relief of your arm pain. Many patients note an increase in their arm symptoms 2 to 4 days after surgery. Such pain is usually related to inflammation and improves with time. Difficulty swallowing, pain around your shoulders, shoulder blades and the base of your skull are common following surgery. Those symptoms improve as you heal following surgery. Your arm numbness may be a little worse following surgery and should improve with time. Please call our office even at night if you began to experience pain, numbness or weakness in your previously asymptomatic arm or legs, numbness in your groin or saddle area, difficulty controlling your bladder or bowels, or severe pain, difficulty breathing or difficulty walking. Incision/Showering:  Keep your incision clean and dry. You may shower with the dressing that was applied at the hospital. Please avoid bathing in a tub until discussed with the doctor. You may remove the dressing and leave it off on the 4th day after surgery. Look at your wound daily. Call the office if you notice a foul odor or drainage. Call for fevers over 100.5 degrees or go to the emergency room for evaluation. While it is rare, you may be developing an infection. Activity/Driving:  Walking is encouraged as a daily routine. Walk as far as you like. This builds and maintains muscles. Avoid twisting, bending and most importantly, lifting over 5 pounds. Steps are permissible but should be limited to a few times a day only in the first weeks after your surgery. Please refrain from driving until your first visit with the doctor 2 weeks after surgery.  Remember that while you are taking narcotic medications you cannot react or move as quickly and it may be unsafe to drive. You will also need to use your rear view mirrors for improved visibility. Diet/Bowel routine:  Eat three healthy meals a day to assist your body to heal well. Avoid eating hard bits of food like nuts and popcorn that may stick in your throat because of swelling related to your neck surgery. Call the office if you are having difficulty with swallowing. Constipation is common after surgery and especially while on pain medications. Apple or prune juice may help with this problem. You may also try any over the counter laxative you feel may be helpful. Drinking more water is another way to keep your bowels moving. Braces: You may have been given a neck collar brace at the hospital.  If you have received one,  then you should be wearing it at all times when in an upright position. It can be removed for bathing or sleep. Medications: You can resume your usual home medicines after you are discharged from the hospital.  Additionally, pain medications or anti-inflammatory medications may have been  prescribed. These medicines have been given to you to assist in relief of pain related to your surgery. It will be helpful to you to keep your pain under control in order to assist you in completing the recommended daily walks. Returning to work:  Some patients can return to a sedentary job in 2-3 weeks. Heavy  lifting jobs require more time off of work. Please discuss this with the doctor on your first postoperative office visit. Healing may take up to 3 or more  months after a major spine surgery. Diabetics:  Sometimes blood sugars are elevated after a surgery and may take up to a week to return to your usual levels. As well, if you have been prescribed a Medrol Steroid Pack, your sugars are likely to become somewhat elevated for a short period of time.   Please monitor your blood sugars daily and call your primary doctor if the levels become higher than 200.    Follow-up Appointment:  You should contact the office for an appointment with the doctor or his assistant 2-3 weeks after surgery.         Dr. Juana Peraza      [phone]

## 2022-10-17 NOTE — H&P
I have reviewed the history and physical and examined the patient and find no relevant changes. I have reviewed with the patient and/or family the risks, benefits, and alternatives to the procedure. The patient was counseled at length about the risks of navarro Covid-19 during their perioperative period and any recovery window from their procedure. The patient was made aware that navarro Covid-19  may worsen their prognosis for recovering from their procedure  and lend to a higher morbidity and/or mortality risk. All material risks, benefits, and reasonable alternatives including postponing the procedure were discussed. The patient does wish to proceed with the procedure at this time. Karina Rangel MD  10/17/2022 No intake/output data recorded.

## 2022-10-17 NOTE — PROGRESS NOTES
1003 pt arrived from OR, vitals stable on 8L non-rebreather. Pt drowsy. Anterior neck dressing clean, dry, and intact- gauze/tegaderm- scan serosanguinous drainage. KAMILAH bulb drain intact, compressed to suction, small amount of serosanguinous drainage in tubing. Bilateral radial pulses 2+, bilateral pedal pulses 2+, pt too drowsy for sensation or movement assessment.

## 2022-10-17 NOTE — LETTER
Vick Monge  : 1975  Diagnosis: Cervical HNP C6/C7    Surgeon: Justin Staton    DOS: 10/17/22    Procedure:  1. 21152 Anterior discectomy, decompression of spinal cord,  interbody fusion C6/C7  2. 75896 Anterior cervical plate C6 to C7  3. 79073 Structural allograft

## 2022-10-17 NOTE — PROGRESS NOTES
1235 taking over the care of this patient at this time. Up in chair. No complaints. Discharge instructions given to patient and her family.  Verbalizes understanding

## 2022-10-17 NOTE — PROGRESS NOTES
Ambulatory Surgery/Procedure Discharge Note    Vitals:    10/17/22 1125   BP: (!) 166/92   Pulse: 83   Resp: 18   Temp: 97 °F (36.1 °C)   SpO2: 93%       In: 1000 [I.V.:950]  Out: 100     Restroom use offered before discharge. Yes    Pain assessment:  present - adequately treated; states tolerable. Does not want a pain pill. Pain Level: 0        Patient discharged to home/self care.  Patient discharged via wheel chair by transporter to waiting family/S.O.       10/17/2022 1:06 PM

## 2022-10-17 NOTE — PROGRESS NOTES
Pt alert and oriented, denies pain. Full strength in all extremities, denies numbness or tingling in extremities. Vitals stable on room air. Small amount of drainage in tubing of KAMILAH drain, none in bulb.

## 2022-11-01 ENCOUNTER — OFFICE VISIT (OUTPATIENT)
Dept: ORTHOPEDIC SURGERY | Age: 47
End: 2022-11-01

## 2022-11-01 DIAGNOSIS — M54.2 CERVICAL PAIN (NECK): Primary | ICD-10-CM

## 2022-11-01 DIAGNOSIS — M50.20 CERVICAL DISC HERNIATION: ICD-10-CM

## 2022-11-01 PROCEDURE — 99024 POSTOP FOLLOW-UP VISIT: CPT | Performed by: ORTHOPAEDIC SURGERY

## 2022-11-01 NOTE — PROGRESS NOTES
Ms. Lisa Zelaya returns today 2 week s/p C6/C7 ACDF right C6-C7. She reports marked improvement of her arm symptoms. She has mild interscapular pain and swallowing discomfort. Her incisions show no signs of infection. She has a normal gait and 5/5 strength of her wrist DFs/VFs and biceps/triceps bilaterally. Her sensation is intact from C5 to C8 bilaterally. I reviewed AP and LAT x-rays of her cervical spine that were obtained in the office today. They show good position of her plate, screws and bone graft. She will return in 8 weeks for repeat x-rays.

## (undated) DEVICE — 3M™ IOBAN™ 2 ANTIMICROBIAL INCISE DRAPE 6640EZ: Brand: IOBAN™ 2

## (undated) DEVICE — SUTURE MCRYL + SZ 4-0 L18IN ABSRB UD L19MM PS-2 3/8 CIR MCP496G

## (undated) DEVICE — BIT DRL L14MM DIA2.2MM G FLAT CHK FOR ANT CERV PLT SYS

## (undated) DEVICE — SOLUTION IV IRRIG 500ML 0.9% SODIUM CHL 2F7123

## (undated) DEVICE — GLOVE,SURG,SENSICARE SLT,LF,PF,7.5: Brand: MEDLINE

## (undated) DEVICE — SUTURE VCRL + SZ 3-0 L18IN ABSRB UD SH 1/2 CIR TAPERCUT NDL VCP864D

## (undated) DEVICE — LIMB HOLDER, WRIST OR ANKLE: Brand: DEROYAL

## (undated) DEVICE — LOTION PREP REMV 5OZ IODO CLR TINC OF BENZ DURAPREP

## (undated) DEVICE — GLOVE,SURG,SENSICARE SLT,LF,PF,6: Brand: MEDLINE

## (undated) DEVICE — SHEET,DRAPE,53X77,STERILE: Brand: MEDLINE

## (undated) DEVICE — COVER,TABLE,77X90,STERILE: Brand: MEDLINE

## (undated) DEVICE — MICRO KOVER: Brand: UNBRANDED

## (undated) DEVICE — NEEDLE HYPO 25GA L1.5IN BVL ORIENTED ECLIPSE

## (undated) DEVICE — BLADE ES ELASTOMERIC COAT INSUL DURABLE BEND UPTO 90DEG

## (undated) DEVICE — GOWN SIRUS NONREIN XL W/TWL: Brand: MEDLINE INDUSTRIES, INC.

## (undated) DEVICE — STRIP,CLOSURE,WOUND,MEDI-STRIP,1/2X4: Brand: MEDLINE

## (undated) DEVICE — GOWN SIRUS NONREIN LG W/TWL: Brand: MEDLINE INDUSTRIES, INC.

## (undated) DEVICE — TOOL 14MH30 LEGEND 14CM 3MM: Brand: MIDAS REX ™

## (undated) DEVICE — SURGIFOAM SPNG SZ 100

## (undated) DEVICE — 1010 S-DRAPE TOWEL DRAPE 10/BX: Brand: STERI-DRAPE™

## (undated) DEVICE — SYRINGE MED 30ML STD CLR PLAS LUERLOCK TIP N CTRL DISP

## (undated) DEVICE — DRAIN,WOUND,15FR,3/16,FULL-FLUTED: Brand: MEDLINE

## (undated) DEVICE — RESERVOIR,SUCTION,100CC,SILICONE: Brand: MEDLINE

## (undated) DEVICE — GAUZE,SPONGE,2"X2",8PLY,STERILE,LF,2'S: Brand: MEDLINE

## (undated) DEVICE — TZ MEDICAL TITANIUM DISTRACTION PINS 14MM: Brand: TZ MEDICAL TITANIUM DISTRACTION PINS

## (undated) DEVICE — SURGIFOAM SPNG SZ 12-7

## (undated) DEVICE — NEURO SPINE: Brand: MEDLINE INDUSTRIES, INC.

## (undated) DEVICE — 3M™ TEGADERM™ TRANSPARENT FILM DRESSING FRAME STYLE, 1626W, 4 IN X 4-3/4 IN (10 CM X 12 CM), 50/CT 4CT/CASE: Brand: 3M™ TEGADERM™

## (undated) DEVICE — C-ARM: Brand: UNBRANDED